# Patient Record
Sex: MALE | Race: WHITE | NOT HISPANIC OR LATINO | Employment: FULL TIME | URBAN - NONMETROPOLITAN AREA
[De-identification: names, ages, dates, MRNs, and addresses within clinical notes are randomized per-mention and may not be internally consistent; named-entity substitution may affect disease eponyms.]

---

## 2019-09-30 ENCOUNTER — OFFICE VISIT (OUTPATIENT)
Dept: FAMILY MEDICINE CLINIC | Facility: CLINIC | Age: 49
End: 2019-09-30
Payer: COMMERCIAL

## 2019-09-30 VITALS
DIASTOLIC BLOOD PRESSURE: 66 MMHG | SYSTOLIC BLOOD PRESSURE: 116 MMHG | HEIGHT: 69 IN | BODY MASS INDEX: 26.36 KG/M2 | WEIGHT: 178 LBS

## 2019-09-30 DIAGNOSIS — E03.9 ACQUIRED HYPOTHYROIDISM: Chronic | ICD-10-CM

## 2019-09-30 DIAGNOSIS — N52.9 VASCULOGENIC ERECTILE DYSFUNCTION, UNSPECIFIED VASCULOGENIC ERECTILE DYSFUNCTION TYPE: Chronic | ICD-10-CM

## 2019-09-30 DIAGNOSIS — Z00.00 WELLNESS EXAMINATION: ICD-10-CM

## 2019-09-30 DIAGNOSIS — F19.11 HISTORY OF SUBSTANCE ABUSE (HCC): Chronic | ICD-10-CM

## 2019-09-30 DIAGNOSIS — F33.41 RECURRENT MAJOR DEPRESSIVE DISORDER, IN PARTIAL REMISSION (HCC): Primary | ICD-10-CM

## 2019-09-30 PROCEDURE — 99396 PREV VISIT EST AGE 40-64: CPT | Performed by: FAMILY MEDICINE

## 2019-09-30 RX ORDER — ARIPIPRAZOLE 2 MG/1
2 TABLET ORAL DAILY
Qty: 30 TABLET | Refills: 5 | Status: SHIPPED | OUTPATIENT
Start: 2019-09-30 | End: 2020-07-27 | Stop reason: SDUPTHER

## 2019-09-30 RX ORDER — SILDENAFIL CITRATE 20 MG/1
20 TABLET ORAL DAILY PRN
COMMUNITY
End: 2019-12-27 | Stop reason: SDUPTHER

## 2019-09-30 NOTE — PROGRESS NOTES
ADULT ANNUAL [address] [facility] PRIMARY CARE    NAME: María Raza  AGE: 50 y o  SEX: male  : 1970     DATE: 2019     Assessment and Plan:     Problem List Items Addressed This Visit        Endocrine    Acquired hypothyroidism (Chronic)       Other    Recurrent major depressive disorder, in partial remission ([facility] ) - Primary (Chronic)    Relevant Medications    ARIPiprazole (ABILIFY) 2 mg tablet    Vasculogenic erectile dysfunction (Chronic)    History of substance abuse ([facility] ) (Chronic)      Other Visit Diagnoses     Wellness examination        Will check renal and lipid panel for wellness check    Relevant Orders    Renal function panel    Lipid panel          Immunizations and preventive care screenings were discussed with patient today  Appropriate education was printed on patient's after visit summary  Counseling:  · Alcohol/drug use: discussed moderation in alcohol intake, the recommendations for healthy alcohol use, and avoidance of illicit drug use  Return in about 4 months (around 2020) for Recheck  Chief Complaint:     Chief Complaint   Patient presents with    Annual Exam     discuss recurrent sinus infections-did see  Dr Carole Oconnor but wasn't helpful  would like to discuss blood work      History of Present Illness:     Adult Annual Physical   Patient here for a comprehensive physical exam  The patient reports problems - Having trouble with fatigue which he feel this relates to the Seroquel only taking 12-,1/2 mg at this time  Diet and Physical Activity  · Diet/Nutrition: well balanced diet  · Exercise: walking        Depression Screening  PHQ-9 Depression Screening    PHQ-9:    Frequency of the following problems over the past two weeks:       Little interest or pleasure in doing things:  0 - not at all  Feeling down, depressed, or hopeless:  0 - not at all  PHQ-2 Score:  0       General Health  · Sleep: sleeps well    · Hearing: normal - bilateral   · Vision: no vision problems  · Dental: regular dental visits  [facility]  · Symptoms include: erectile dysfunction     Review of Systems:     Review of Systems   Constitutional: Positive for fatigue (Which he feels is due to the Seroquel)  Negative for activity change, appetite change, chills, fever and unexpected weight change  HENT: Negative for congestion, dental problem, hearing loss, rhinorrhea, trouble swallowing and voice change  Eyes: Negative for visual disturbance  Respiratory: Negative for apnea, cough, chest tightness and shortness of breath  Cardiovascular: Negative for chest pain, palpitations and leg swelling  Gastrointestinal: Negative for abdominal distention, abdominal pain, constipation and diarrhea  Endocrine: Negative for polyuria ( nocturia x1)  Genitourinary: Negative for difficulty urinating and enuresis  Musculoskeletal: Negative for arthralgias and myalgias  Skin: Negative for rash  Allergic/Immunologic: Negative for environmental allergies  Neurological: Negative for dizziness, weakness, light-headedness, numbness and headaches  Hematological: Negative for adenopathy  Does not bruise/bleed easily  Psychiatric/Behavioral: Negative for agitation, confusion and sleep disturbance        Past Medical History:     Past Medical History:   Diagnosis Date    Depressive disorder     Hypothyroidism       Past Surgical History:     Past Surgical History:   Procedure Laterality Date    NOSE SURGERY      TONSILLECTOMY        Family History:     Family History   Problem Relation Age of Onset   [place] Breast cancer Mother     Kidney cancer Mother     Breast cancer Family     Kidney cancer Family       Social History:     Social History     Socioeconomic History    Marital status: /Civil Union     Spouse name: None    Number of children: None    Years of education: None    Highest education level: None   Occupational History    None   Social Needs    Financial resource strain: None    Food insecurity:     Worry: None     Inability: None    Transportation needs:     Medical: None     Non-medical: None   Tobacco Use    Smoking status: Former Smoker    Smokeless tobacco: Never Used   Substance and Sexual Activity    Alcohol use: Not Currently     Comment: Denies alcohol use - As per [employer] Drug use: Never    Sexual activity: None   Lifestyle    Physical activity:     Days per week: None     Minutes per session: None    Stress: None   Relationships    Social connections:     Talks on phone: None     Gets together: None     Attends Islam service: None     Active member of club or organization: None     Attends meetings of clubs or organizations: None     Relationship status: None    Intimate partner violence:     Fear of current or ex partner: None     Emotionally abused: None     Physically abused: None     Forced sexual activity: None   Other Topics Concern    None   Social History Narrative    Home is not smoke-free       Current Medications:     Current Outpatient Medications   Medication Sig Dispense Refill    buprenorphine-naloxone (SUBOXONE) 8-2 mg Place 1 tablet under the tongue daily      FLUoxetine (PROzac) 40 MG capsule Take 40 mg by mouth daily      levothyroxine 125 mcg tablet Take 100 mcg by mouth daily       QUEtiapine (SEROquel) 25 mg tablet Take 25 mg by mouth      sildenafil (REVATIO) 20 mg tablet Take 20 mg by mouth daily as needed      ARIPiprazole (ABILIFY) 2 mg tablet Take 1 tablet (2 mg total) by mouth daily 30 tablet 5    terbinafine (LamISIL) 1 % cream Apply 1 application topically 2 (two) times a day       No current facility-administered medications for this visit         Allergies:     No Known Allergies   Physical Exam:     /66 (BP Location: Right arm, Patient Position: Sitting, Cuff Size: Standard)   Ht 5' 9" (1 753 m)   Wt 80 7 kg (178 lb)   BMI 26 29 kg/m² Physical Exam   Constitutional: He is oriented to person, place, and time  He appears well-developed  HENT:   Head: Normocephalic  Right Ear: Hearing, tympanic membrane, external ear and ear canal normal    Left Ear: Hearing, tympanic membrane, external ear and ear canal normal    Nose: Nose normal    Mouth/Throat: Oropharynx is clear and moist    Eyes: Pupils are equal, round, and reactive to light  Conjunctivae and EOM are normal    Neck: Normal range of motion  Neck supple  No thyromegaly present  Cardiovascular: Normal rate, regular rhythm, normal heart sounds and intact distal pulses  No murmur (Rate is 78 no bruits are noted) heard  Pulses:       Dorsalis pedis pulses are 1+ on the right side, and 1+ on the left side  Posterior tibial pulses are 2+ on the right side, and 2+ on the left side  Pulmonary/Chest: Effort normal and breath sounds normal    Abdominal: Soft  He exhibits no distension and no mass  There is no tenderness  Genitourinary: Penis normal    Musculoskeletal: Normal range of motion  He exhibits no edema  Feet:   Right Foot:   Protective Sensation: 8 sites tested  8 sites sensed  Left Foot:   Protective Sensation: 8 sites tested  8 sites sensed  Lymphadenopathy:     He has no cervical adenopathy  Neurological: He is alert and oriented to person, place, and time  He displays normal reflexes  No cranial nerve deficit or sensory deficit  He exhibits normal muscle tone  Coordination normal    Skin: Skin is warm and dry  Capillary refill takes less than 2 seconds  No rash noted  Psychiatric: He has a normal mood and affect  His behavior is normal  Judgment and thought content normal    Nursing note and vitals reviewed        Elba Anand MD  [address]

## 2019-09-30 NOTE — PATIENT INSTRUCTIONS
Overall exam today is stable  Discussed the problem with the fatigue and will try switching from Seroquel over to Abilify 2 mg daily    Patient is going to get flu shot at work and will check renal and lipid panel for wellness check

## 2019-12-27 DIAGNOSIS — N52.9 VASCULOGENIC ERECTILE DYSFUNCTION, UNSPECIFIED VASCULOGENIC ERECTILE DYSFUNCTION TYPE: Primary | Chronic | ICD-10-CM

## 2019-12-27 RX ORDER — SILDENAFIL CITRATE 20 MG/1
TABLET ORAL
Qty: 5 TABLET | Refills: 0 | Status: SHIPPED | OUTPATIENT
Start: 2019-12-27 | End: 2020-01-23

## 2020-01-23 DIAGNOSIS — N52.9 VASCULOGENIC ERECTILE DYSFUNCTION, UNSPECIFIED VASCULOGENIC ERECTILE DYSFUNCTION TYPE: Chronic | ICD-10-CM

## 2020-01-23 RX ORDER — SILDENAFIL CITRATE 20 MG/1
TABLET ORAL
Qty: 5 TABLET | Refills: 0 | Status: SHIPPED | OUTPATIENT
Start: 2020-01-23 | End: 2020-01-27 | Stop reason: SDUPTHER

## 2020-01-27 ENCOUNTER — OFFICE VISIT (OUTPATIENT)
Dept: FAMILY MEDICINE CLINIC | Facility: CLINIC | Age: 50
End: 2020-01-27
Payer: COMMERCIAL

## 2020-01-27 VITALS
BODY MASS INDEX: 29.18 KG/M2 | HEIGHT: 69 IN | DIASTOLIC BLOOD PRESSURE: 72 MMHG | SYSTOLIC BLOOD PRESSURE: 122 MMHG | WEIGHT: 197 LBS

## 2020-01-27 DIAGNOSIS — F19.11 HISTORY OF SUBSTANCE ABUSE (HCC): Chronic | ICD-10-CM

## 2020-01-27 DIAGNOSIS — E03.9 ACQUIRED HYPOTHYROIDISM: Primary | Chronic | ICD-10-CM

## 2020-01-27 DIAGNOSIS — N52.9 VASCULOGENIC ERECTILE DYSFUNCTION, UNSPECIFIED VASCULOGENIC ERECTILE DYSFUNCTION TYPE: Chronic | ICD-10-CM

## 2020-01-27 DIAGNOSIS — F33.41 RECURRENT MAJOR DEPRESSIVE DISORDER, IN PARTIAL REMISSION (HCC): Chronic | ICD-10-CM

## 2020-01-27 PROCEDURE — 99213 OFFICE O/P EST LOW 20 MIN: CPT | Performed by: FAMILY MEDICINE

## 2020-01-27 PROCEDURE — 1036F TOBACCO NON-USER: CPT | Performed by: FAMILY MEDICINE

## 2020-01-27 PROCEDURE — 3008F BODY MASS INDEX DOCD: CPT | Performed by: FAMILY MEDICINE

## 2020-01-27 RX ORDER — SILDENAFIL CITRATE 20 MG/1
20 TABLET ORAL DAILY PRN
Qty: 15 TABLET | Refills: 5 | Status: SHIPPED | OUTPATIENT
Start: 2020-01-27 | End: 2020-06-15

## 2020-01-27 NOTE — PATIENT INSTRUCTIONS
Seems to be doing well but is concerned about the weight gain which I do not feel related to the Abilify    Discussed dietary changes and should try to limit starches to 1 medium serving per meal   Will recheck thyroid levels

## 2020-01-27 NOTE — PROGRESS NOTES
Assessment/Plan:    Acquired hypothyroidism  Will recheck thyroid levels which could relate to the weight gain and will adjust meds accordingly    History of substance abuse ([facility] )  Is now using injection continue overall follow-up    Recurrent major depressive disorder, in partial remission ([facility] )  Seemingly doing well with just use of the Prozac will continue with this time    Vasculogenic erectile dysfunction  Appears stable continue p r n  Meds  I did give refill    BMI 29 0-29 9,adult  Discussed need to limit starches to 1 medium serving per day and get mild exercise on a daily basis       Diagnoses and all orders for this visit:    Acquired hypothyroidism  -     TSH, 3rd generation with Free T4 reflex; Future    Vasculogenic erectile dysfunction, unspecified vasculogenic erectile dysfunction type  -     sildenafil (REVATIO) 20 mg tablet; Take 1 tablet (20 mg total) by mouth daily as needed (Q day as needed)    History of substance abuse (HCC)    Recurrent major depressive disorder, in partial remission (HCC)    BMI 29 0-29 9,adult          Subjective:      Patient ID: Vanessa Negrete is a 52 y o  male  Patient has history of hypothyroidism, substance abuse, erectile dysfunction and depression  Had been concerned about weight gain and stop the Abilify about 3-4 weeks ago although the weight has not improved  Does feel the depression is doing okay with just use of the Prozac      The following portions of the patient's history were reviewed and updated as appropriate: allergies, current medications, past medical history, past social history and problem list BMI Counseling: Body mass index is 29 09 kg/m²  The BMI is above normal  Nutrition recommendations include moderation in carbohydrate intake       Review of Systems   Constitutional: Negative for activity change, appetite change, chills, fatigue, fever and unexpected weight change     HENT: Negative for congestion, rhinorrhea, trouble swallowing and voice change  Eyes: Negative for visual disturbance  Respiratory: Negative for apnea, cough, chest tightness and shortness of breath  Cardiovascular: Negative for chest pain, palpitations and leg swelling  Gastrointestinal: Negative for abdominal distention, abdominal pain, constipation and diarrhea  Endocrine: Negative for polyuria (Nocturia times 1)  Genitourinary: Negative for difficulty urinating  Musculoskeletal: Negative for arthralgias and myalgias  Skin: Negative for rash  Allergic/Immunologic: Negative for environmental allergies  Neurological: Negative for dizziness, weakness, light-headedness, numbness and headaches  Hematological: Negative for adenopathy  Does not bruise/bleed easily  Psychiatric/Behavioral: Negative for agitation, confusion, dysphoric mood and sleep disturbance  Objective:      /72 (BP Location: Right arm, Patient Position: Sitting, Cuff Size: Standard)   Ht 5' 9" (1 753 m)   Wt 89 4 kg (197 lb)   BMI 29 09 kg/m²          Physical Exam   Constitutional: He is oriented to person, place, and time  He appears well-developed and well-nourished  No distress  HENT:   Head: Normocephalic  Nose: Nose normal    Mouth/Throat: Oropharynx is clear and moist    Eyes: Conjunctivae are normal    Neck: Neck supple  No thyromegaly present  Cardiovascular: Normal rate, regular rhythm and normal heart sounds  No murmur (Rate is 72 no bruits are noted) heard  Pulmonary/Chest: Effort normal and breath sounds normal    Abdominal: He exhibits no distension and no mass  There is no tenderness  Musculoskeletal: He exhibits no edema  Lymphadenopathy:     He has no cervical adenopathy  Neurological: He is alert and oriented to person, place, and time  He displays normal reflexes  Coordination normal    Skin: Skin is warm and dry  No rash noted  Psychiatric: He has a normal mood and affect   His behavior is normal  Judgment and thought content normal  Nursing note and vitals reviewed

## 2020-01-29 DIAGNOSIS — E03.9 ACQUIRED HYPOTHYROIDISM: Primary | Chronic | ICD-10-CM

## 2020-01-29 RX ORDER — LEVOTHYROXINE SODIUM 0.15 MG/1
150 TABLET ORAL
Qty: 30 TABLET | Refills: 5 | Status: SHIPPED | OUTPATIENT
Start: 2020-01-29 | End: 2020-06-24

## 2020-01-30 DIAGNOSIS — E03.9 ACQUIRED HYPOTHYROIDISM: Primary | Chronic | ICD-10-CM

## 2020-05-11 ENCOUNTER — TELEPHONE (OUTPATIENT)
Dept: FAMILY MEDICINE CLINIC | Facility: CLINIC | Age: 50
End: 2020-05-11

## 2020-05-11 DIAGNOSIS — Z20.822 CLOSE EXPOSURE TO COVID-19 VIRUS: Primary | ICD-10-CM

## 2020-06-03 PROBLEM — Z86.16 HISTORY OF 2019 NOVEL CORONAVIRUS DISEASE (COVID-19): Status: ACTIVE | Noted: 2020-06-03

## 2020-06-03 LAB — SARS-COV-2 IGG SERPL QL IA: POSITIVE

## 2020-06-08 ENCOUNTER — OFFICE VISIT (OUTPATIENT)
Dept: FAMILY MEDICINE CLINIC | Facility: CLINIC | Age: 50
End: 2020-06-08
Payer: COMMERCIAL

## 2020-06-08 VITALS
HEIGHT: 69 IN | TEMPERATURE: 97.9 F | DIASTOLIC BLOOD PRESSURE: 78 MMHG | SYSTOLIC BLOOD PRESSURE: 132 MMHG | WEIGHT: 212 LBS | BODY MASS INDEX: 31.4 KG/M2

## 2020-06-08 DIAGNOSIS — R63.5 ABNORMAL WEIGHT GAIN: ICD-10-CM

## 2020-06-08 DIAGNOSIS — F33.41 RECURRENT MAJOR DEPRESSIVE DISORDER, IN PARTIAL REMISSION (HCC): Chronic | ICD-10-CM

## 2020-06-08 DIAGNOSIS — F19.11 HISTORY OF SUBSTANCE ABUSE (HCC): Chronic | ICD-10-CM

## 2020-06-08 DIAGNOSIS — E03.9 ACQUIRED HYPOTHYROIDISM: Primary | Chronic | ICD-10-CM

## 2020-06-08 DIAGNOSIS — R63.5 WEIGHT GAIN, ABNORMAL: ICD-10-CM

## 2020-06-08 PROCEDURE — 1036F TOBACCO NON-USER: CPT | Performed by: FAMILY MEDICINE

## 2020-06-08 PROCEDURE — 99213 OFFICE O/P EST LOW 20 MIN: CPT | Performed by: FAMILY MEDICINE

## 2020-06-08 PROCEDURE — 3008F BODY MASS INDEX DOCD: CPT | Performed by: FAMILY MEDICINE

## 2020-06-15 DIAGNOSIS — N52.9 VASCULOGENIC ERECTILE DYSFUNCTION, UNSPECIFIED VASCULOGENIC ERECTILE DYSFUNCTION TYPE: Chronic | ICD-10-CM

## 2020-06-15 RX ORDER — SILDENAFIL CITRATE 20 MG/1
TABLET ORAL
Qty: 5 TABLET | Refills: 5 | Status: SHIPPED | OUTPATIENT
Start: 2020-06-15 | End: 2020-09-22

## 2020-06-23 LAB
ALBUMIN SERPL-MCNC: 4 G/DL (ref 3.6–5.1)
BUN SERPL-MCNC: 21 MG/DL (ref 7–25)
BUN/CREAT SERPL: ABNORMAL (CALC) (ref 6–22)
CALCIUM SERPL-MCNC: 9.1 MG/DL (ref 8.6–10.3)
CHLORIDE SERPL-SCNC: 102 MMOL/L (ref 98–110)
CO2 SERPL-SCNC: 29 MMOL/L (ref 20–32)
CREAT SERPL-MCNC: 0.7 MG/DL (ref 0.6–1.35)
GLUCOSE SERPL-MCNC: 101 MG/DL (ref 65–99)
PHOSPHATE SERPL-MCNC: 4.5 MG/DL (ref 2.5–4.5)
POTASSIUM SERPL-SCNC: 4.2 MMOL/L (ref 3.5–5.3)
SL AMB EGFR AFRICAN AMERICAN: 128 ML/MIN/1.73M2
SL AMB EGFR NON AFRICAN AMERICAN: 111 ML/MIN/1.73M2
SODIUM SERPL-SCNC: 139 MMOL/L (ref 135–146)
T4 FREE SERPL-MCNC: 1 NG/DL (ref 0.8–1.8)
TSH SERPL-ACNC: 14.02 MIU/L (ref 0.4–4.5)

## 2020-06-24 ENCOUNTER — TELEPHONE (OUTPATIENT)
Dept: FAMILY MEDICINE CLINIC | Facility: CLINIC | Age: 50
End: 2020-06-24

## 2020-06-24 DIAGNOSIS — E03.9 ACQUIRED HYPOTHYROIDISM: Chronic | ICD-10-CM

## 2020-06-24 RX ORDER — LEVOTHYROXINE SODIUM 175 UG/1
175 TABLET ORAL
Qty: 90 TABLET | Refills: 3 | Status: SHIPPED | OUTPATIENT
Start: 2020-06-24 | End: 2021-07-22

## 2020-06-29 ENCOUNTER — OFFICE VISIT (OUTPATIENT)
Dept: FAMILY MEDICINE CLINIC | Facility: CLINIC | Age: 50
End: 2020-06-29
Payer: COMMERCIAL

## 2020-06-29 VITALS
BODY MASS INDEX: 31.1 KG/M2 | HEIGHT: 69 IN | WEIGHT: 210 LBS | SYSTOLIC BLOOD PRESSURE: 122 MMHG | DIASTOLIC BLOOD PRESSURE: 72 MMHG

## 2020-06-29 DIAGNOSIS — G57.12 NEUROPATHY OF LEFT LATERAL FEMORAL CUTANEOUS NERVE: Primary | ICD-10-CM

## 2020-06-29 PROCEDURE — 1036F TOBACCO NON-USER: CPT | Performed by: FAMILY MEDICINE

## 2020-06-29 PROCEDURE — 3008F BODY MASS INDEX DOCD: CPT | Performed by: FAMILY MEDICINE

## 2020-06-29 PROCEDURE — 99213 OFFICE O/P EST LOW 20 MIN: CPT | Performed by: FAMILY MEDICINE

## 2020-06-29 RX ORDER — AMITRIPTYLINE HYDROCHLORIDE 10 MG/1
10 TABLET, FILM COATED ORAL
Qty: 60 TABLET | Refills: 5 | Status: SHIPPED | OUTPATIENT
Start: 2020-06-29 | End: 2020-07-13 | Stop reason: ALTCHOICE

## 2020-07-13 ENCOUNTER — OFFICE VISIT (OUTPATIENT)
Dept: FAMILY MEDICINE CLINIC | Facility: CLINIC | Age: 50
End: 2020-07-13
Payer: COMMERCIAL

## 2020-07-13 VITALS
BODY MASS INDEX: 31.1 KG/M2 | DIASTOLIC BLOOD PRESSURE: 76 MMHG | SYSTOLIC BLOOD PRESSURE: 126 MMHG | TEMPERATURE: 97.8 F | WEIGHT: 210 LBS | HEIGHT: 69 IN

## 2020-07-13 DIAGNOSIS — G57.12 NEUROPATHY OF LEFT LATERAL FEMORAL CUTANEOUS NERVE: Primary | Chronic | ICD-10-CM

## 2020-07-13 PROCEDURE — 99213 OFFICE O/P EST LOW 20 MIN: CPT | Performed by: FAMILY MEDICINE

## 2020-07-13 RX ORDER — AMITRIPTYLINE HYDROCHLORIDE 25 MG/1
25 TABLET, FILM COATED ORAL
Qty: 30 TABLET | Refills: 5 | Status: SHIPPED | OUTPATIENT
Start: 2020-07-13 | End: 2021-01-06

## 2020-07-13 NOTE — PATIENT INSTRUCTIONS
Overall seems to be doing well with the amitriptyline    Will switch the prescription from the 10 mg tablet taking 2 over  to 25 mg taking 1 at night

## 2020-07-13 NOTE — PROGRESS NOTES
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  -     amitriptyline (ELAVIL) 25 mg tablet; Take 1 tablet (25 mg total) by mouth daily at bedtime          Subjective:      Patient ID: Thom Griffin is a 52 y o  male  Patient has history of hypothyroidism, substance abuse, erectile dysfunction and depression  Has been having longstanding problem with numbness with intermittent pain in the left thigh that has gotten somewhat worse   Was placed on amitriptyline 10 mg and tapered up to 20 mg at night and is doing much better  Still has the numbness although the pain is basically resolved      The following portions of the patient's history were reviewed and updated as appropriate: allergies, current medications, past medical history, past social history and problem list       Review of Systems   Constitutional: Negative for fatigue  HENT: Negative for congestion  Respiratory: Negative for cough  Cardiovascular: Negative for leg swelling  Gastrointestinal: Negative for abdominal pain  Musculoskeletal: Negative for arthralgias and myalgias  Neurological: Positive for numbness  Negative for weakness  Psychiatric/Behavioral: Negative for sleep disturbance  Objective:      /76 (BP Location: Right arm, Patient Position: Sitting, Cuff Size: Standard)   Temp 97 8 °F (36 6 °C)   Ht 5' 9" (1 753 m)   Wt 95 3 kg (210 lb)   BMI 31 01 kg/m²          Physical Exam   Constitutional: He is oriented to person, place, and time  He appears well-developed and well-nourished  No distress  HENT:   Head: Normocephalic     Eyes: Conjunctivae are normal    Neck: Normal range of motion  Neck supple  No thyromegaly present  Cardiovascular: Normal rate, regular rhythm and normal heart sounds  No murmur (Rate is 72  ) heard  Pulmonary/Chest: Effort normal and breath sounds normal    Abdominal: He exhibits no distension and no mass  There is no tenderness  Musculoskeletal: Normal range of motion  He exhibits no edema  Lymphadenopathy:     He has no cervical adenopathy  Neurological: He is alert and oriented to person, place, and time  He displays normal reflexes  Coordination normal    Skin: Skin is warm and dry  No rash noted  Psychiatric: He has a normal mood and affect  Nursing note and vitals reviewed

## 2020-07-13 NOTE — ASSESSMENT & PLAN NOTE
Doing much better will switch from the 20 mg dose of 2 tablets over to 25 mg dose with 1 tablet    Should take this at bedtime patient had question is needs to continue or other antidepressants and I suggest yes

## 2020-07-27 DIAGNOSIS — F33.41 RECURRENT MAJOR DEPRESSIVE DISORDER, IN PARTIAL REMISSION (HCC): ICD-10-CM

## 2020-07-27 RX ORDER — ARIPIPRAZOLE 2 MG/1
2 TABLET ORAL DAILY
Qty: 30 TABLET | Refills: 5 | Status: SHIPPED | OUTPATIENT
Start: 2020-07-27 | End: 2021-04-12

## 2020-09-22 DIAGNOSIS — N52.9 VASCULOGENIC ERECTILE DYSFUNCTION, UNSPECIFIED VASCULOGENIC ERECTILE DYSFUNCTION TYPE: Chronic | ICD-10-CM

## 2020-09-22 RX ORDER — SILDENAFIL CITRATE 20 MG/1
TABLET ORAL
Qty: 15 TABLET | Refills: 2 | Status: SHIPPED | OUTPATIENT
Start: 2020-09-22 | End: 2021-01-04

## 2020-12-14 ENCOUNTER — OFFICE VISIT (OUTPATIENT)
Dept: FAMILY MEDICINE CLINIC | Facility: CLINIC | Age: 50
End: 2020-12-14
Payer: COMMERCIAL

## 2020-12-14 VITALS
TEMPERATURE: 98.4 F | BODY MASS INDEX: 31.96 KG/M2 | HEIGHT: 69 IN | DIASTOLIC BLOOD PRESSURE: 78 MMHG | WEIGHT: 215.8 LBS | SYSTOLIC BLOOD PRESSURE: 130 MMHG | HEART RATE: 92 BPM | OXYGEN SATURATION: 86 %

## 2020-12-14 DIAGNOSIS — Z12.11 SPECIAL SCREENING FOR MALIGNANT NEOPLASMS, COLON: ICD-10-CM

## 2020-12-14 DIAGNOSIS — Z23 NEEDS FLU SHOT: ICD-10-CM

## 2020-12-14 DIAGNOSIS — Z00.00 WELLNESS EXAMINATION: Primary | ICD-10-CM

## 2020-12-14 DIAGNOSIS — E03.9 ACQUIRED HYPOTHYROIDISM: Chronic | ICD-10-CM

## 2020-12-14 DIAGNOSIS — G57.12 NEUROPATHY OF LEFT LATERAL FEMORAL CUTANEOUS NERVE: Chronic | ICD-10-CM

## 2020-12-14 DIAGNOSIS — F33.41 RECURRENT MAJOR DEPRESSIVE DISORDER, IN PARTIAL REMISSION (HCC): Chronic | ICD-10-CM

## 2020-12-14 DIAGNOSIS — Z12.12 SCREENING FOR MALIGNANT NEOPLASM OF THE RECTUM: ICD-10-CM

## 2020-12-14 PROCEDURE — 1036F TOBACCO NON-USER: CPT | Performed by: FAMILY MEDICINE

## 2020-12-14 PROCEDURE — 3008F BODY MASS INDEX DOCD: CPT | Performed by: FAMILY MEDICINE

## 2020-12-14 PROCEDURE — 90471 IMMUNIZATION ADMIN: CPT | Performed by: FAMILY MEDICINE

## 2020-12-14 PROCEDURE — 3725F SCREEN DEPRESSION PERFORMED: CPT | Performed by: FAMILY MEDICINE

## 2020-12-14 PROCEDURE — 99396 PREV VISIT EST AGE 40-64: CPT | Performed by: FAMILY MEDICINE

## 2020-12-14 PROCEDURE — 90682 RIV4 VACC RECOMBINANT DNA IM: CPT | Performed by: FAMILY MEDICINE

## 2020-12-14 RX ORDER — FLUOXETINE HYDROCHLORIDE 40 MG/1
40 CAPSULE ORAL DAILY
Qty: 30 CAPSULE | Refills: 5 | Status: SHIPPED | OUTPATIENT
Start: 2020-12-14 | End: 2022-03-20

## 2021-01-04 DIAGNOSIS — N52.9 VASCULOGENIC ERECTILE DYSFUNCTION, UNSPECIFIED VASCULOGENIC ERECTILE DYSFUNCTION TYPE: Chronic | ICD-10-CM

## 2021-01-04 RX ORDER — SILDENAFIL CITRATE 20 MG/1
TABLET ORAL
Qty: 15 TABLET | Refills: 5 | Status: SHIPPED | OUTPATIENT
Start: 2021-01-04 | End: 2021-08-14 | Stop reason: HOSPADM

## 2021-01-05 DIAGNOSIS — G57.12 NEUROPATHY OF LEFT LATERAL FEMORAL CUTANEOUS NERVE: Chronic | ICD-10-CM

## 2021-01-06 RX ORDER — AMITRIPTYLINE HYDROCHLORIDE 25 MG/1
TABLET, FILM COATED ORAL
Qty: 30 TABLET | Refills: 5 | Status: SHIPPED | OUTPATIENT
Start: 2021-01-06

## 2021-01-12 ENCOUNTER — TELEPHONE (OUTPATIENT)
Dept: FAMILY MEDICINE CLINIC | Facility: CLINIC | Age: 51
End: 2021-01-12

## 2021-01-12 LAB
ALBUMIN SERPL-MCNC: 4.4 G/DL (ref 3.6–5.1)
BUN SERPL-MCNC: 19 MG/DL (ref 7–25)
BUN/CREAT SERPL: ABNORMAL (CALC) (ref 6–22)
CALCIUM SERPL-MCNC: 9.5 MG/DL (ref 8.6–10.3)
CHLORIDE SERPL-SCNC: 100 MMOL/L (ref 98–110)
CHOLEST SERPL-MCNC: 228 MG/DL
CHOLEST/HDLC SERPL: 3.1 (CALC)
CO2 SERPL-SCNC: 27 MMOL/L (ref 20–32)
CREAT SERPL-MCNC: 0.84 MG/DL (ref 0.7–1.33)
GLUCOSE SERPL-MCNC: 101 MG/DL (ref 65–99)
HDLC SERPL-MCNC: 73 MG/DL
LDLC SERPL CALC-MCNC: 126 MG/DL (CALC)
NONHDLC SERPL-MCNC: 155 MG/DL (CALC)
PHOSPHATE SERPL-MCNC: 3.9 MG/DL (ref 2.5–4.5)
POTASSIUM SERPL-SCNC: 3.8 MMOL/L (ref 3.5–5.3)
SL AMB EGFR AFRICAN AMERICAN: 118 ML/MIN/1.73M2
SL AMB EGFR NON AFRICAN AMERICAN: 102 ML/MIN/1.73M2
SODIUM SERPL-SCNC: 138 MMOL/L (ref 135–146)
T4 FREE SERPL-MCNC: 1.3 NG/DL (ref 0.8–1.8)
TRIGL SERPL-MCNC: 173 MG/DL
TSH SERPL-ACNC: 1.38 MIU/L (ref 0.4–4.5)

## 2021-01-12 NOTE — RESULT ENCOUNTER NOTE
Please call the patient regarding his abnormal result  Fasting blood sugar is again borderline high at 101  Watch sweets in diet cholesterol levels are okay  Thyroid levels are okay    Continue current meds

## 2021-01-12 NOTE — TELEPHONE ENCOUNTER
----- Message from Alejandro Suh MD sent at 1/12/2021  8:46 AM EST -----  Please call the patient regarding his abnormal result  Fasting blood sugar is again borderline high at 101  Watch sweets in diet cholesterol levels are okay  Thyroid levels are okay    Continue current meds      Left message for Laurie Hermosillo to call back

## 2021-02-10 ENCOUNTER — TELEPHONE (OUTPATIENT)
Dept: FAMILY MEDICINE CLINIC | Facility: CLINIC | Age: 51
End: 2021-02-10

## 2021-02-10 NOTE — TELEPHONE ENCOUNTER
Mick Villanueva called, he had FLMA filled out by you in July and it  with his employer so he needs it redone  Does he need to make an appointment with you or can you complete it if he drops it off based on the [facility] paperwork in July and his appt with you 2 months ago?

## 2021-02-11 ENCOUNTER — OFFICE VISIT (OUTPATIENT)
Dept: FAMILY MEDICINE CLINIC | Facility: CLINIC | Age: 51
End: 2021-02-11

## 2021-02-11 VITALS
SYSTOLIC BLOOD PRESSURE: 124 MMHG | DIASTOLIC BLOOD PRESSURE: 74 MMHG | WEIGHT: 212 LBS | HEART RATE: 84 BPM | HEIGHT: 69 IN | BODY MASS INDEX: 31.4 KG/M2 | OXYGEN SATURATION: 97 %

## 2021-02-11 DIAGNOSIS — G57.12 NEUROPATHY OF LEFT LATERAL FEMORAL CUTANEOUS NERVE: Primary | Chronic | ICD-10-CM

## 2021-02-11 PROCEDURE — 1036F TOBACCO NON-USER: CPT | Performed by: FAMILY MEDICINE

## 2021-02-11 PROCEDURE — 99213 OFFICE O/P EST LOW 20 MIN: CPT | Performed by: FAMILY MEDICINE

## 2021-02-11 PROCEDURE — 3008F BODY MASS INDEX DOCD: CPT | Performed by: FAMILY MEDICINE

## 2021-02-11 RX ORDER — BUPRENORPHINE 100 MG/1
SOLUTION SUBCUTANEOUS
COMMUNITY
Start: 2020-12-31 | End: 2021-08-14 | Stop reason: HOSPADM

## 2021-02-11 NOTE — PROGRESS NOTES
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      BMI Counseling: Body mass index is 31 31 kg/m²  The BMI is above normal  Nutrition recommendations include moderation in carbohydrate intake  Exercise recommendations include moderate physical activity 150 minutes/week  No pharmacotherapy was ordered  Subjective:      Patient ID: Eric Fong is a 48 y o  male  Patient has history of hypothyroidism, substance abuse, erectile dysfunction and depression  Has been having recurring problems with pain in the left femoral cutaneous nerve  This is usually settled with use of the amitriptyline but does continue to intermittently flare and has been taking a day off work when this occurs perhaps every other month  Is going to need FMLA form so that he does not get points for this  It is not painful today      The following portions of the patient's history were reviewed and updated as appropriate: allergies, current medications, past medical history, past social history and problem list     Review of Systems   Constitutional: Negative for activity change  HENT: Negative for congestion  Eyes: Negative for visual disturbance  Cardiovascular: Negative for leg swelling  Gastrointestinal: Negative for abdominal pain  Genitourinary: Positive for testicular pain  Negative for difficulty urinating  Musculoskeletal: Positive for myalgias ( intermittent pain in the left thigh)  Negative for arthralgias  Skin: Negative for rash  Neurological: Positive for numbness ( slight in the left thigh)  Negative for weakness  Hematological: Negative for adenopathy  Psychiatric/Behavioral: Negative for agitation and sleep disturbance  Objective:      /74 (BP Location: Right arm, Patient Position: Sitting, Cuff Size: Standard)   Pulse 84   Ht 5' 9" (1 753 m)   Wt 96 2 kg (212 lb)   SpO2 97%   BMI 31 31 kg/m²          Physical Exam  Vitals signs and nursing note reviewed  Constitutional:       Appearance: He is well-developed  HENT:      Head: Normocephalic  Eyes:      Conjunctiva/sclera: Conjunctivae normal    Neck:      Musculoskeletal: Normal range of motion and neck supple  Thyroid: No thyromegaly  Cardiovascular:      Rate and Rhythm: Normal rate and regular rhythm  Heart sounds: Normal heart sounds  No murmur ( rate is Seventy-eight)  Pulmonary:      Effort: Pulmonary effort is normal       Breath sounds: Normal breath sounds  Abdominal:      General: Abdomen is flat  There is no distension  Palpations: Abdomen is soft  There is no mass  Tenderness: There is no abdominal tenderness  Musculoskeletal: Normal range of motion  General: No tenderness ( to palpation of the left thigh area)  Right lower leg: No edema  Left lower leg: No edema  Lymphadenopathy:      Cervical: No cervical adenopathy  Skin:     General: Skin is warm and dry  Findings: No rash  Neurological:      Mental Status: He is alert and oriented to person, place, and time        Coordination: Coordination normal       Gait: Gait normal       Deep Tendon Reflexes: Reflexes normal    Psychiatric:         Mood and Affect: Mood normal

## 2021-02-11 NOTE — PATIENT INSTRUCTIONS
Discussed problem  Does continue to have intermittent problems with the femoral cutaneous nerve which requires in the take a day off of work    Overall I feel is doing well with the amitriptyline with this but will fill out the [facility] paper [employer] needs the additional time off which seems to occur perhaps once a month or less

## 2021-02-11 NOTE — ASSESSMENT & PLAN NOTE
Continues to have intermittent problems with pain which I feel may represent problem switch pressure against the anterior thigh when lifting boxes    But overall has been doing well with use of the amitriptyline will fill out the intermittent FMLA form and continue the amitriptyline

## 2021-02-19 ENCOUNTER — TELEPHONE (OUTPATIENT)
Dept: FAMILY MEDICINE CLINIC | Facility: CLINIC | Age: 51
End: 2021-02-19

## 2021-02-19 NOTE — TELEPHONE ENCOUNTER
Phone call to patient  Left message on machine to call back with credit card info so I call put the charge in before he picks up forms

## 2021-03-18 ENCOUNTER — TELEPHONE (OUTPATIENT)
Dept: FAMILY MEDICINE CLINIC | Facility: CLINIC | Age: 51
End: 2021-03-18

## 2021-03-18 NOTE — TELEPHONE ENCOUNTER
Phone call from patient 3/17/21  Had left message on machine at 4:48pm(I was already gone for the day) Stated something about a return phone call and a bill? I looked in his account  He had paid his $30FMLA form fee  I had advised him at last appt RTE was showing $30 copay  He stated he has never had a copay before  I think that is why he called  I called patient back  But had to leave a message on his machine

## 2021-04-12 DIAGNOSIS — F33.41 RECURRENT MAJOR DEPRESSIVE DISORDER, IN PARTIAL REMISSION (HCC): ICD-10-CM

## 2021-04-12 RX ORDER — ARIPIPRAZOLE 2 MG/1
TABLET ORAL
Qty: 30 TABLET | Refills: 0 | Status: SHIPPED | OUTPATIENT
Start: 2021-04-12 | End: 2021-07-26

## 2021-07-22 DIAGNOSIS — E03.9 ACQUIRED HYPOTHYROIDISM: Chronic | ICD-10-CM

## 2021-07-22 RX ORDER — LEVOTHYROXINE SODIUM 175 UG/1
TABLET ORAL
Qty: 30 TABLET | Refills: 0 | Status: SHIPPED | OUTPATIENT
Start: 2021-07-22 | End: 2022-01-03 | Stop reason: SDUPTHER

## 2021-08-13 ENCOUNTER — HOSPITAL ENCOUNTER (INPATIENT)
Facility: HOSPITAL | Age: 51
LOS: 1 days | Discharge: HOME | DRG: 897 | End: 2021-08-14
Attending: EMERGENCY MEDICINE | Admitting: FAMILY MEDICINE
Payer: COMMERCIAL

## 2021-08-13 DIAGNOSIS — F10.239 ALCOHOL WITHDRAWAL (HCC): ICD-10-CM

## 2021-08-13 DIAGNOSIS — F10.19 ALCOHOL ABUSE WITH UNSPECIFIED ALCOHOL-INDUCED DISORDER (HCC): ICD-10-CM

## 2021-08-13 DIAGNOSIS — F10.230 ALCOHOL WITHDRAWAL SYNDROME WITHOUT COMPLICATION (HCC): Primary | ICD-10-CM

## 2021-08-13 LAB
ALBUMIN SERPL BCP-MCNC: 3.9 G/DL (ref 3.5–5)
ALP SERPL-CCNC: 80 U/L (ref 46–116)
ALT SERPL W P-5'-P-CCNC: 39 U/L (ref 12–78)
AMPHETAMINES SERPL QL SCN: NEGATIVE
ANION GAP SERPL CALCULATED.3IONS-SCNC: 11 MMOL/L (ref 4–13)
AST SERPL W P-5'-P-CCNC: 24 U/L (ref 5–45)
BARBITURATES UR QL: NEGATIVE
BASOPHILS # BLD AUTO: 0.07 THOUSANDS/ΜL (ref 0–0.1)
BASOPHILS NFR BLD AUTO: 1 % (ref 0–1)
BENZODIAZ UR QL: NEGATIVE
BILIRUB SERPL-MCNC: 0.38 MG/DL (ref 0.2–1)
BILIRUB UR QL STRIP: NEGATIVE
BUN SERPL-MCNC: 16 MG/DL (ref 5–25)
CALCIUM SERPL-MCNC: 8 MG/DL (ref 8.3–10.1)
CHLORIDE SERPL-SCNC: 104 MMOL/L (ref 100–108)
CLARITY UR: CLEAR
CO2 SERPL-SCNC: 25 MMOL/L (ref 21–32)
COCAINE UR QL: NEGATIVE
COLOR UR: YELLOW
CREAT SERPL-MCNC: 0.78 MG/DL (ref 0.6–1.3)
EOSINOPHIL # BLD AUTO: 0.59 THOUSAND/ΜL (ref 0–0.61)
EOSINOPHIL NFR BLD AUTO: 5 % (ref 0–6)
ERYTHROCYTE [DISTWIDTH] IN BLOOD BY AUTOMATED COUNT: 12.6 % (ref 11.6–15.1)
ETHANOL SERPL-MCNC: <3 MG/DL (ref 0–3)
GFR SERPL CREATININE-BSD FRML MDRD: 105 ML/MIN/1.73SQ M
GLUCOSE SERPL-MCNC: 121 MG/DL (ref 65–140)
GLUCOSE UR STRIP-MCNC: NEGATIVE MG/DL
HCT VFR BLD AUTO: 41.5 % (ref 36.5–49.3)
HGB BLD-MCNC: 14.2 G/DL (ref 12–17)
HGB UR QL STRIP.AUTO: NEGATIVE
IMM GRANULOCYTES # BLD AUTO: 0.03 THOUSAND/UL (ref 0–0.2)
IMM GRANULOCYTES NFR BLD AUTO: 0 % (ref 0–2)
KETONES UR STRIP-MCNC: NEGATIVE MG/DL
LEUKOCYTE ESTERASE UR QL STRIP: NEGATIVE
LYMPHOCYTES # BLD AUTO: 1.5 THOUSANDS/ΜL (ref 0.6–4.47)
LYMPHOCYTES NFR BLD AUTO: 13 % (ref 14–44)
MCH RBC QN AUTO: 30.9 PG (ref 26.8–34.3)
MCHC RBC AUTO-ENTMCNC: 34.2 G/DL (ref 31.4–37.4)
MCV RBC AUTO: 90 FL (ref 82–98)
METHADONE UR QL: NEGATIVE
MONOCYTES # BLD AUTO: 0.71 THOUSAND/ΜL (ref 0.17–1.22)
MONOCYTES NFR BLD AUTO: 6 % (ref 4–12)
NEUTROPHILS # BLD AUTO: 8.44 THOUSANDS/ΜL (ref 1.85–7.62)
NEUTS SEG NFR BLD AUTO: 75 % (ref 43–75)
NITRITE UR QL STRIP: NEGATIVE
NRBC BLD AUTO-RTO: 0 /100 WBCS
OPIATES UR QL SCN: NEGATIVE
OXYCODONE+OXYMORPHONE UR QL SCN: NEGATIVE
PCP UR QL: NEGATIVE
PH UR STRIP.AUTO: 6.5 [PH]
PLATELET # BLD AUTO: 263 THOUSANDS/UL (ref 149–390)
PMV BLD AUTO: 10 FL (ref 8.9–12.7)
POTASSIUM SERPL-SCNC: 3.7 MMOL/L (ref 3.5–5.3)
PROT SERPL-MCNC: 7.9 G/DL (ref 6.4–8.2)
PROT UR STRIP-MCNC: NEGATIVE MG/DL
RBC # BLD AUTO: 4.6 MILLION/UL (ref 3.88–5.62)
SODIUM SERPL-SCNC: 140 MMOL/L (ref 136–145)
SP GR UR STRIP.AUTO: 1.02 (ref 1–1.03)
THC UR QL: NEGATIVE
TSH SERPL DL<=0.05 MIU/L-ACNC: 4.82 UIU/ML (ref 0.36–3.74)
UROBILINOGEN UR QL STRIP.AUTO: 0.2 E.U./DL
WBC # BLD AUTO: 11.34 THOUSAND/UL (ref 4.31–10.16)

## 2021-08-13 PROCEDURE — 81003 URINALYSIS AUTO W/O SCOPE: CPT | Performed by: EMERGENCY MEDICINE

## 2021-08-13 PROCEDURE — 99285 EMERGENCY DEPT VISIT HI MDM: CPT | Performed by: EMERGENCY MEDICINE

## 2021-08-13 PROCEDURE — 82077 ASSAY SPEC XCP UR&BREATH IA: CPT | Performed by: EMERGENCY MEDICINE

## 2021-08-13 PROCEDURE — 85025 COMPLETE CBC W/AUTO DIFF WBC: CPT | Performed by: EMERGENCY MEDICINE

## 2021-08-13 PROCEDURE — 96375 TX/PRO/DX INJ NEW DRUG ADDON: CPT

## 2021-08-13 PROCEDURE — 36415 COLL VENOUS BLD VENIPUNCTURE: CPT | Performed by: EMERGENCY MEDICINE

## 2021-08-13 PROCEDURE — 99223 1ST HOSP IP/OBS HIGH 75: CPT | Performed by: NURSE PRACTITIONER

## 2021-08-13 PROCEDURE — 93005 ELECTROCARDIOGRAM TRACING: CPT

## 2021-08-13 PROCEDURE — 99285 EMERGENCY DEPT VISIT HI MDM: CPT

## 2021-08-13 PROCEDURE — 80053 COMPREHEN METABOLIC PANEL: CPT | Performed by: EMERGENCY MEDICINE

## 2021-08-13 PROCEDURE — 80307 DRUG TEST PRSMV CHEM ANLYZR: CPT | Performed by: EMERGENCY MEDICINE

## 2021-08-13 PROCEDURE — 96374 THER/PROPH/DIAG INJ IV PUSH: CPT

## 2021-08-13 PROCEDURE — 84443 ASSAY THYROID STIM HORMONE: CPT | Performed by: EMERGENCY MEDICINE

## 2021-08-13 PROCEDURE — 96361 HYDRATE IV INFUSION ADD-ON: CPT

## 2021-08-13 RX ORDER — LEVOTHYROXINE SODIUM 175 UG/1
175 TABLET ORAL
Status: DISCONTINUED | OUTPATIENT
Start: 2021-08-14 | End: 2021-08-14 | Stop reason: HOSPADM

## 2021-08-13 RX ORDER — ACETAMINOPHEN 325 MG/1
650 TABLET ORAL EVERY 6 HOURS PRN
Status: DISCONTINUED | OUTPATIENT
Start: 2021-08-13 | End: 2021-08-14 | Stop reason: HOSPADM

## 2021-08-13 RX ORDER — FOLIC ACID 1 MG/1
1 TABLET ORAL DAILY
Status: DISCONTINUED | OUTPATIENT
Start: 2021-08-14 | End: 2021-08-14 | Stop reason: HOSPADM

## 2021-08-13 RX ORDER — CHLORDIAZEPOXIDE HYDROCHLORIDE 25 MG/1
25 CAPSULE, GELATIN COATED ORAL ONCE
Status: DISCONTINUED | OUTPATIENT
Start: 2021-08-13 | End: 2021-08-13

## 2021-08-13 RX ORDER — LANOLIN ALCOHOL/MO/W.PET/CERES
100 CREAM (GRAM) TOPICAL DAILY
Status: DISCONTINUED | OUTPATIENT
Start: 2021-08-14 | End: 2021-08-14 | Stop reason: HOSPADM

## 2021-08-13 RX ORDER — ARIPIPRAZOLE 2 MG/1
2 TABLET ORAL DAILY
Status: DISCONTINUED | OUTPATIENT
Start: 2021-08-14 | End: 2021-08-14 | Stop reason: HOSPADM

## 2021-08-13 RX ORDER — CHLORDIAZEPOXIDE HYDROCHLORIDE 25 MG/1
50 CAPSULE, GELATIN COATED ORAL 3 TIMES DAILY PRN
Qty: 30 CAPSULE | Refills: 0 | Status: SHIPPED | OUTPATIENT
Start: 2021-08-13 | End: 2021-08-14 | Stop reason: HOSPADM

## 2021-08-13 RX ORDER — SODIUM CHLORIDE 9 MG/ML
100 INJECTION, SOLUTION INTRAVENOUS CONTINUOUS
Status: DISCONTINUED | OUTPATIENT
Start: 2021-08-13 | End: 2021-08-14

## 2021-08-13 RX ORDER — DIAZEPAM 5 MG/ML
2.5 INJECTION, SOLUTION INTRAMUSCULAR; INTRAVENOUS ONCE
Status: DISCONTINUED | OUTPATIENT
Start: 2021-08-13 | End: 2021-08-13

## 2021-08-13 RX ORDER — ONDANSETRON 2 MG/ML
4 INJECTION INTRAMUSCULAR; INTRAVENOUS EVERY 6 HOURS PRN
Status: DISCONTINUED | OUTPATIENT
Start: 2021-08-13 | End: 2021-08-14 | Stop reason: HOSPADM

## 2021-08-13 RX ORDER — ONDANSETRON 2 MG/ML
4 INJECTION INTRAMUSCULAR; INTRAVENOUS ONCE
Status: COMPLETED | OUTPATIENT
Start: 2021-08-13 | End: 2021-08-13

## 2021-08-13 RX ORDER — CHLORDIAZEPOXIDE HYDROCHLORIDE 25 MG/1
25 CAPSULE, GELATIN COATED ORAL ONCE
Status: COMPLETED | OUTPATIENT
Start: 2021-08-13 | End: 2021-08-13

## 2021-08-13 RX ORDER — LORAZEPAM 2 MG/ML
1 INJECTION INTRAMUSCULAR ONCE
Status: COMPLETED | OUTPATIENT
Start: 2021-08-13 | End: 2021-08-13

## 2021-08-13 RX ADMIN — SODIUM CHLORIDE 1000 ML: 0.9 INJECTION, SOLUTION INTRAVENOUS at 20:16

## 2021-08-13 RX ADMIN — ONDANSETRON 4 MG: 2 INJECTION INTRAMUSCULAR; INTRAVENOUS at 21:20

## 2021-08-13 RX ADMIN — CHLORDIAZEPOXIDE HYDROCHLORIDE 25 MG: 25 CAPSULE ORAL at 21:19

## 2021-08-13 RX ADMIN — LORAZEPAM 1 MG: 2 INJECTION INTRAMUSCULAR; INTRAVENOUS at 20:25

## 2021-08-13 NOTE — ED PROVIDER NOTES
History  Chief Complaint   Patient presents with    Shaking     pt took 100mg naltrexone around 1700  pt also states he had approx 3 shots of vodka around 1500  c/o shakiness, nauseousness, and restlessness  denies any pain     Patient is a 51-year-old male presenting to the emergency room with wife for complaints diffuse body aches with shakes and nausea, patient is trying to stop drinking alcohol, is a daily consumer of alcohol in large quantities, generally drinks multiple shots or glasses of vodka throughout the day, states he did have 3 shots of vodka this morning, and then again around 3:00 p m , around 5 or 6:00 p m  he took a dose of naltrexone which was prescribed by his PCP to assist in alcohol dependence, since then he has had increased shaking, body aches, restlessness and discomfort, he reports nausea but no vomiting, has attempted to detox from alcohol in the past, no known history of alcohol withdrawal seizures, denies drug use, denies suicidal or homicidal thoughts or depression          Prior to Admission Medications   Prescriptions Last Dose Informant Patient Reported? Taking? ARIPiprazole (ABILIFY) 2 mg tablet   No No   Sig: TAKE 1 TABLET BY MOUTH ONCE DAILY  Buprenorphine ER (Sublocade) 300 MG/1 5ML SOSY   Yes No   Sig: Inject under the skin every 30 (thirty) days   FLUoxetine (PROzac) 40 MG capsule   No No   Sig: Take 1 capsule (40 mg total) by mouth daily   Sublocade 100 MG/0 5ML   Yes No   amitriptyline (ELAVIL) 25 mg tablet   No No   Sig: TAKE 1 TABLET BY MOUTH EACH EVENING AT BEDTIME    buprenorphine-naloxone (SUBOXONE) 8-2 mg   Yes No   Sig: Place 1 tablet under the tongue daily   levothyroxine 175 mcg tablet   No No   Sig: TAKE 1 TABLET BY MOUTH IN THE EARLY MORNING   sildenafil (REVATIO) 20 mg tablet   No No   Sig: TAKE ONE TABLET BY MOUTH ONCE DAILY AS NEEDED     terbinafine (LamISIL) 1 % cream   Yes No   Sig: Apply 1 application topically 2 (two) times a day Facility-Administered Medications: None       Past Medical History:   Diagnosis Date    Depressive disorder     Hypothyroidism        Past Surgical History:   Procedure Laterality Date    NOSE SURGERY      TONSILLECTOMY         Family History   Problem Relation Age of Onset   [facility] Breast cancer Mother     Kidney cancer Mother     Breast cancer Family     Kidney cancer Family      I have reviewed and agree with the history as documented  E-Cigarette/Vaping    E-Cigarette Use Never User      E-Cigarette/Vaping Substances     Social History     Tobacco Use    Smoking status: Former Smoker     Packs/day: 1 00     Years: 25 00     Pack years: 25 00     Types: Cigarettes     Quit date: 2015     Years since quittin 5    Smokeless tobacco: Never Used   Vaping Use    Vaping Use: Never used   Substance Use Topics    Alcohol use: Yes     Alcohol/week: 5 0 standard drinks     Types: 5 Shots of liquor per week     Comment: 5 shots daily    Drug use: Never       Review of Systems   Constitutional: Negative  HENT: Negative  Eyes: Negative  Respiratory: Negative  Cardiovascular: Negative  Gastrointestinal: Positive for nausea  Endocrine: Negative  Genitourinary: Negative  Musculoskeletal: Positive for myalgias  Skin: Negative  Allergic/Immunologic: Negative  Neurological: Negative  Hematological: Negative  Psychiatric/Behavioral: Negative  Restlessness, shaking, alcohol withdrawal       Physical Exam  Physical Exam  Constitutional:       Appearance: Normal appearance  He is well-developed  HENT:      Head: Normocephalic and atraumatic  Nose: Nose normal       Mouth/Throat:      Mouth: Mucous membranes are moist    Eyes:      Conjunctiva/sclera: Conjunctivae normal       Pupils: Pupils are equal, round, and reactive to light  Cardiovascular:      Rate and Rhythm: Normal rate     Pulmonary:      Effort: Pulmonary effort is normal    Abdominal: Palpations: Abdomen is soft  Musculoskeletal:         General: Normal range of motion  Cervical back: Normal range of motion and neck supple  Skin:     General: Skin is warm and dry  Neurological:      General: No focal deficit present  Mental Status: He is alert and oriented to person, place, and time  Mental status is at baseline     Psychiatric:      Comments: Restlessness         Vital Signs  ED Triage Vitals [08/13/21 2009]   Temperature Pulse Respirations Blood Pressure SpO2   98 °F (36 7 °C) 75 18 (!) 161/102 98 %      Temp Source Heart Rate Source Patient Position - Orthostatic VS BP Location FiO2 (%)   Temporal Monitor Sitting Right arm --      Pain Score       No Pain           Vitals:    08/13/21 2009 08/13/21 2115   BP: (!) 161/102 142/90   Pulse: 75 78   Patient Position - Orthostatic VS: Sitting Sitting         Visual Acuity      ED Medications  Medications   sodium chloride 0 9 % bolus 1,000 mL (0 mL Intravenous Stopped 8/13/21 2129)   LORazepam (ATIVAN) injection 1 mg (1 mg Intravenous Given 8/13/21 2025)   chlordiazePOXIDE (LIBRIUM) capsule 25 mg (25 mg Oral Given 8/13/21 2119)   ondansetron (ZOFRAN) injection 4 mg (4 mg Intravenous Given 8/13/21 2120)       Diagnostic Studies  Results Reviewed     Procedure Component Value Units Date/Time    Comprehensive metabolic panel [[id number]]  (Abnormal) Collected: 08/13/21 2023    Lab Status: Final result Specimen: Blood from Arm, Right Updated: 08/13/21 2119     Sodium 140 mmol/L      Potassium 3 7 mmol/L      Chloride 104 mmol/L      CO2 25 mmol/L      ANION GAP 11 mmol/L      BUN 16 mg/dL      Creatinine 0 78 mg/dL      Glucose 121 mg/dL      Calcium 8 0 mg/dL      AST 24 U/L      ALT 39 U/L      Alkaline Phosphatase 80 U/L      Total Protein 7 9 g/dL      Albumin 3 9 g/dL      Total Bilirubin 0 38 mg/dL      eGFR 105 ml/min/1 73sq m     Narrative:      [place] guidelines for Chronic Kidney Disease (CKD):    Stage 1 with normal or high GFR (GFR > 90 mL/min/1 73 square meters)    Stage 2 Mild CKD (GFR = 60-89 mL/min/1 73 square meters)    Stage 3A Moderate CKD (GFR = 45-59 mL/min/1 73 square meters)    Stage 3B Moderate CKD (GFR = 30-44 mL/min/1 73 square meters)    Stage 4 Severe CKD (GFR = 15-29 mL/min/1 73 square meters)    Stage 5 End Stage CKD (GFR <15 mL/min/1 73 square meters)  Note: GFR calculation is accurate only with a steady state creatinine    Ethanol [[id number]]  (Normal) Collected: 08/13/21 2023    Lab Status: Final result Specimen: Blood from Arm, Right Updated: 08/13/21 2119     Ethanol Lvl <3 mg/dL     TSH [[id number]]  (Abnormal) Collected: 08/13/21 2023    Lab Status: Final result Specimen: Blood from Arm, Right Updated: 08/13/21 2101     TSH 3RD GENERATON 4 823 uIU/mL     Narrative:      Patients undergoing fluorescein dye angiography may retain small amounts of fluorescein in the body for 48-72 hours post procedure  Samples containing fluorescein can produce falsely depressed TSH values  If the patient had this procedure,a specimen should be resubmitted post fluorescein clearance  Rapid drug screen, urine [[id number]]  (Normal) Collected: 08/13/21 2023    Lab Status: Final result Specimen: Urine, Clean Catch Updated: 08/13/21 2048     Amph/Meth UR Negative     Barbiturate Ur Negative     Benzodiazepine Urine Negative     Cocaine Urine Negative     Methadone Urine Negative     Opiate Urine Negative     PCP Ur Negative     THC Urine Negative     Oxycodone Urine Negative    Narrative:      FOR MEDICAL PURPOSES ONLY  IF CONFIRMATION NEEDED PLEASE CONTACT THE LAB WITHIN 5 DAYS      Drug Screen Cutoff Levels:  AMPHETAMINE/METHAMPHETAMINES  1000 ng/mL  BARBITURATES     200 ng/mL  BENZODIAZEPINES     200 ng/mL  COCAINE      300 ng/mL  METHADONE      300 ng/mL  OPIATES      300 ng/mL  PHENCYCLIDINE     25 ng/mL  THC       50 ng/mL  OXYCODONE      100 ng/mL    UA w Reflex to Microscopic w Reflex to Culture [[id number]] Collected: 08/13/21 2023    Lab Status: Final result Specimen: Urine, Clean Catch Updated: 08/13/21 2033     Color, UA Yellow     Clarity, UA Clear     Specific Gravity, UA 1 025     pH, UA 6 5     Leukocytes, UA Negative     Nitrite, UA Negative     Protein, UA Negative mg/dl      Glucose, UA Negative mg/dl      Ketones, UA Negative mg/dl      Urobilinogen, UA 0 2 E U /dl      Bilirubin, UA Negative     Blood, UA Negative    CBC and differential [[id number]]  (Abnormal) Collected: 08/13/21 2023    Lab Status: Final result Specimen: Blood from Arm, Right Updated: 08/13/21 2033     WBC 11 34 Thousand/uL      RBC 4 60 Million/uL      Hemoglobin 14 2 g/dL      Hematocrit 41 5 %      MCV 90 fL      MCH 30 9 pg      MCHC 34 2 g/dL      RDW 12 6 %      MPV 10 0 fL      Platelets 348 Thousands/uL      nRBC 0 /100 WBCs      Neutrophils Relative 75 %      Immat GRANS % 0 %      Lymphocytes Relative 13 %      Monocytes Relative 6 %      Eosinophils Relative 5 %      Basophils Relative 1 %      Neutrophils Absolute 8 44 Thousands/µL      Immature Grans Absolute 0 03 Thousand/uL      Lymphocytes Absolute 1 50 Thousands/µL      Monocytes Absolute 0 71 Thousand/µL      Eosinophils Absolute 0 59 Thousand/µL      Basophils Absolute 0 07 Thousands/µL                  No orders to display              Procedures  ECG 12 Lead Documentation Only    Date/Time: 8/13/2021 9:58 PM  Performed by: Val Hung DO  Authorized by: Val Hung DO     Indications / Diagnosis:  Alcohol withdrawal  ECG reviewed by me, the ED Provider: yes    Patient location:  ED  Previous ECG:     Previous ECG:  Unavailable    Comparison to cardiac monitor: Yes    Interpretation:     Interpretation: normal    Rate:     ECG rate:  80    ECG rate assessment: normal    Rhythm:     Rhythm: sinus rhythm    Ectopy:     Ectopy: PVCs      PVCs:  Infrequent  QRS:     QRS intervals:  Normal  Conduction:     Conduction: normal    ST segments: ST segments:  Normal  T waves:     T waves: inverted      Inverted:  III and V1             ED Course  ED Course as of Aug 13 2159   Fri Aug 13, 2021   2156 Laboratory findings discussed at bedside, relatively unremarkable, patient has had slight relief after receiving Librium but once again feeling jittery and restless, discussed treatment plan options for alcohol withdrawal, patient agreeable to admission for further evaluation and treatment, discussed with hospitalist service who are in agreement as well                                          Disposition  Final diagnoses:   Alcohol withdrawal syndrome without complication ([facility] [address] )   Alcohol withdrawal ([facility] [address] )     Time reflects when diagnosis was documented in both MDM as applicable and the Disposition within this note     Time User Action Codes Description Comment    8/13/2021  9:46 PM Keren Israel Add [F10 230] Alcohol withdrawal syndrome without complication ([facility] [address] )     2/63/7511  9:57 PM Wing Ramirez Add [F10 239] Alcohol withdrawal [facility])       ED Disposition     ED Disposition Condition Date/Time Comment    Admit Stable Fri Aug 13, 2021  9:57 PM Case was discussed with NP Mickie Adams and the patient's admission status was agreed to be Admission Status: inpatient status to the service of Dr David Alcantara   Follow-up Information     Follow up With Specialties Details Why Abigail Livingston MD Family Medicine  As needed [address]  [address] [place] [address],[facility] [address] [phone]            Patient's Medications   Discharge Prescriptions    CHLORDIAZEPOXIDE (LIBRIUM) 25 MG CAPSULE    Take 2 capsules (50 mg total) by mouth 3 (three) times a day as needed for withdrawal for up to 5 days       Start Date: 8/13/2021 End Date: 8/18/2021       Order Dose: 50 mg       Quantity: 30 capsule    Refills: 0     No discharge procedures on file      PDMP Review     None          ED Provider  Electronically Signed by           Bart Sierra DO  08/13/21 3001

## 2021-08-14 VITALS
SYSTOLIC BLOOD PRESSURE: 151 MMHG | RESPIRATION RATE: 18 BRPM | HEART RATE: 71 BPM | DIASTOLIC BLOOD PRESSURE: 78 MMHG | OXYGEN SATURATION: 96 % | TEMPERATURE: 99.1 F

## 2021-08-14 PROBLEM — F10.139 ALCOHOL ABUSE WITH WITHDRAWAL (HCC): Status: ACTIVE | Noted: 2021-08-13

## 2021-08-14 LAB
ANION GAP SERPL CALCULATED.3IONS-SCNC: 10 MMOL/L (ref 4–13)
BASOPHILS # BLD AUTO: 0.02 THOUSANDS/ΜL (ref 0–0.1)
BASOPHILS NFR BLD AUTO: 0 % (ref 0–1)
BUN SERPL-MCNC: 11 MG/DL (ref 5–25)
CALCIUM SERPL-MCNC: 8 MG/DL (ref 8.3–10.1)
CHLORIDE SERPL-SCNC: 107 MMOL/L (ref 100–108)
CO2 SERPL-SCNC: 24 MMOL/L (ref 21–32)
CREAT SERPL-MCNC: 0.76 MG/DL (ref 0.6–1.3)
EOSINOPHIL # BLD AUTO: 0.01 THOUSAND/ΜL (ref 0–0.61)
EOSINOPHIL NFR BLD AUTO: 0 % (ref 0–6)
ERYTHROCYTE [DISTWIDTH] IN BLOOD BY AUTOMATED COUNT: 12.5 % (ref 11.6–15.1)
GFR SERPL CREATININE-BSD FRML MDRD: 106 ML/MIN/1.73SQ M
GLUCOSE SERPL-MCNC: 122 MG/DL (ref 65–140)
HCT VFR BLD AUTO: 37.6 % (ref 36.5–49.3)
HGB BLD-MCNC: 12.7 G/DL (ref 12–17)
IMM GRANULOCYTES # BLD AUTO: 0.05 THOUSAND/UL (ref 0–0.2)
IMM GRANULOCYTES NFR BLD AUTO: 1 % (ref 0–2)
LYMPHOCYTES # BLD AUTO: 1.12 THOUSANDS/ΜL (ref 0.6–4.47)
LYMPHOCYTES NFR BLD AUTO: 11 % (ref 14–44)
MCH RBC QN AUTO: 30.3 PG (ref 26.8–34.3)
MCHC RBC AUTO-ENTMCNC: 33.8 G/DL (ref 31.4–37.4)
MCV RBC AUTO: 90 FL (ref 82–98)
MONOCYTES # BLD AUTO: 0.76 THOUSAND/ΜL (ref 0.17–1.22)
MONOCYTES NFR BLD AUTO: 8 % (ref 4–12)
NEUTROPHILS # BLD AUTO: 7.84 THOUSANDS/ΜL (ref 1.85–7.62)
NEUTS SEG NFR BLD AUTO: 80 % (ref 43–75)
NRBC BLD AUTO-RTO: 0 /100 WBCS
PLATELET # BLD AUTO: 231 THOUSANDS/UL (ref 149–390)
PMV BLD AUTO: 10 FL (ref 8.9–12.7)
POTASSIUM SERPL-SCNC: 3.6 MMOL/L (ref 3.5–5.3)
RBC # BLD AUTO: 4.19 MILLION/UL (ref 3.88–5.62)
SODIUM SERPL-SCNC: 141 MMOL/L (ref 136–145)
WBC # BLD AUTO: 9.8 THOUSAND/UL (ref 4.31–10.16)

## 2021-08-14 PROCEDURE — 85025 COMPLETE CBC W/AUTO DIFF WBC: CPT | Performed by: NURSE PRACTITIONER

## 2021-08-14 PROCEDURE — 80048 BASIC METABOLIC PNL TOTAL CA: CPT | Performed by: NURSE PRACTITIONER

## 2021-08-14 PROCEDURE — 99239 HOSP IP/OBS DSCHRG MGMT >30: CPT | Performed by: FAMILY MEDICINE

## 2021-08-14 RX ORDER — CHLORDIAZEPOXIDE HYDROCHLORIDE 5 MG/1
CAPSULE, GELATIN COATED ORAL
Qty: 14 CAPSULE | Refills: 0 | Status: SHIPPED | OUTPATIENT
Start: 2021-08-14 | End: 2021-09-03 | Stop reason: ALTCHOICE

## 2021-08-14 RX ORDER — LORAZEPAM 1 MG/1
2 TABLET ORAL ONCE
Status: COMPLETED | OUTPATIENT
Start: 2021-08-14 | End: 2021-08-14

## 2021-08-14 RX ADMIN — MULTIPLE VITAMINS W/ MINERALS TAB 1 TABLET: TAB at 08:08

## 2021-08-14 RX ADMIN — LORAZEPAM 2 MG: 1 TABLET ORAL at 03:02

## 2021-08-14 RX ADMIN — ARIPIPRAZOLE 2 MG: 2 TABLET ORAL at 08:08

## 2021-08-14 RX ADMIN — FOLIC ACID 1 MG: 1 TABLET ORAL at 08:08

## 2021-08-14 RX ADMIN — SODIUM CHLORIDE 100 ML/HR: 0.9 INJECTION, SOLUTION INTRAVENOUS at 00:01

## 2021-08-14 RX ADMIN — THIAMINE HCL TAB 100 MG 100 MG: 100 TAB at 08:08

## 2021-08-14 RX ADMIN — LEVOTHYROXINE SODIUM 175 MCG: 175 TABLET ORAL at 05:50

## 2021-08-14 RX ADMIN — SODIUM CHLORIDE 100 ML/HR: 0.9 INJECTION, SOLUTION INTRAVENOUS at 09:46

## 2021-08-14 NOTE — ASSESSMENT & PLAN NOTE
· Currently controlled  · Continue Prozac  · May resume his  · Supportive care  · Outpatient follow-up

## 2021-08-14 NOTE — ASSESSMENT & PLAN NOTE
Pt is a 27 year old woman who presents post partum timed 5 days for BP check.   S/p c section.     She is feeling well. She is taking motrin and Norco once a day.     Visit Vitals  LMP 09/09/2017 (Exact Date)       Bps are 148/100 and 140/98.     Normal BPs for her are 100/60-70 range.     Imp. Postpartum HTN.   Plan. Norvasc 5 mg daily, recheck this Friday.  She will let us know if she feels lightheaded after med.    · TSH 4 8  · Continue levothyroxine 175 mcg daily  · Continue outpatient follow-up

## 2021-08-14 NOTE — ASSESSMENT & PLAN NOTE
· History of drinking 3/4 of a fifth of vodka for 6 months  · History of withdrawal symptoms including restlessness, eye watering, and diarrhea-no history of withdrawal seizures  · Took naltrexone for 1st time today, and had about 1/4 of a 5th of vodka-is experiencing restlessness, discomfort  · Medical alcohol is negative, UDS is negative  · CIWA protocol  · Seizure precautions

## 2021-08-14 NOTE — DISCHARGE SUMMARY
[address]  Discharge- Lela Lundberg 1970, 48 y o  male MRN: [id number]  Unit/Bed#: -01 Encounter: [id number]  Primary Care Provider: Morena Elias MD   Date and time admitted to hospital: 8/13/2021  8:01 PM    Recurrent major depressive disorder, in partial remission ([facility] )  Assessment & Plan  · Currently controlled  · Continue Prozac  · May resume his  · Supportive care  · Outpatient follow-up    Acquired hypothyroidism  Assessment & Plan  · TSH 4 8  · Continue levothyroxine 175 mcg daily  · Continue outpatient follow-up  · Recheck TSH in 2 weeks post hospitalization    * Alcohol abuse with withdrawal ([facility] )  Assessment & Plan  · History of drinking 3/4 of a fifth of vodka for 6 months  · History of withdrawal symptoms including restlessness, eye watering, and diarrhea-no history of withdrawal seizures  · Took naltrexone for 1st time today, and had about 1/4 of a 5th of vodka-is experiencing restlessness, discomfort  · Medical alcohol is negative, UDS is negative  · CIWA protocol  · Seizure precautions  · Suspect patient had some withdrawals he received another dose of Ativan at midnight and he is doing so much better there is no shaking there is no tremors suspect drinking in combination with naltrexone with his alcohol being negative consistent with withdrawals  Discussed with the patient that for now though not take naltrexone and also will discharge him on Librium taper 10 mg b i d  For 2 days 10 daily for 2 days 5 daily for 2 days while on does avoid drinking          Medical Problems     Resolved Problems  Date Reviewed: 8/14/2021    None              Discharging Physician / Practitioner: Gregoria Olmstead MD  PCP: Morena Elias MD  Admission Date:   Admission Orders (From admission, onward)     Ordered        08/13/21 2158  Inpatient Admission  Once                   Discharge Date: 08/14/21    Consultations During Hospital Stay:  · nonone    Procedures Performed:   · none    Significant Findings / Test Results:   · none    Incidental Findings:   · none     Test Results Pending at Discharge (will require follow up):   · none     Outpatient Tests Requested:  · none    Complications:  none    Reason for Admission:  Restlessness    Hospital Course:   Stacy Lujan is a 48 y o  male patient who originally presented to the hospital on 8/13/2021 due to shaking all over and restlessness be shin drinks 5 shots of vodka daily  And also heating 100 mg of naltrexone while he was drinking and then it all started  And apparently he started to drink less alcohol than usual and he presented with rest and says of his arms and legs he was given 2 doses of Ativan Librium and since being in the hospital he feels better and all is resolved suspect this is all secondary to withdrawal +emanation of naltrexone drinking as the patient is back to baseline as he is going to not drink and states when he gets home will discharge him on couple of days of Librium taper 10 mg p o  B i d  x2 days and 10 mg daily for 2 days and then 5 mg daily for 2 days discussed follow-up with primary care electrolytes were normal     The patient, initially admitted to the hospital as inpatient, was discharged earlier than expected given the following: Improved earlier than expected  Please see above list of diagnoses and related plan for additional information  Condition at Discharge: stable    Discharge Day Visit / Exam:   Subjective:  Patient seen and examined feels much better no shakes  Vitals: Blood Pressure: 151/78 (08/14/21 0742)  Pulse: 71 (08/14/21 0742)  Temperature: 99 1 °F (37 3 °C) (08/14/21 0742)  Temp Source: Oral (08/14/21 0345)  Respirations: 18 (08/14/21 0742)  SpO2: 96 % (08/14/21 0742)  Exam:   Physical Exam  Vitals and nursing note reviewed  Constitutional:       Appearance: He is well-developed  HENT:      Head: Normocephalic and atraumatic     Eyes: Conjunctiva/sclera: Conjunctivae normal    Cardiovascular:      Rate and Rhythm: Normal rate and regular rhythm  Heart sounds: No murmur heard  Pulmonary:      Effort: Pulmonary effort is normal  No respiratory distress  Breath sounds: Normal breath sounds  Abdominal:      General: There is no distension  Palpations: Abdomen is soft  Tenderness: There is no abdominal tenderness  Musculoskeletal:         General: No swelling  Cervical back: Neck supple  Skin:     General: Skin is warm and dry  Neurological:      General: No focal deficit present  Mental Status: He is alert and oriented to person, place, and time  Mental status is at baseline  Psychiatric:         Mood and Affect: Mood normal          Discussion with Family: Patient declined call to   Patient called and spoke to his wife himself    Discharge instructions/Information to patient and family:   See after visit summary for information provided to patient and family  Provisions for Follow-Up Care:  See after visit summary for information related to follow-up care and any pertinent home health orders  Disposition:   Home    Planned Readmission: no     Discharge Statement:  I spent >35 minutes discharging the patient  This time was spent on the day of discharge  I had direct contact with the patient on the day of discharge  Greater than 50% of the total time was spent examining patient, answering all patient questions, arranging and discussing plan of care with patient as well as directly providing post-discharge instructions  Additional time then spent on discharge activities  Discharge Medications:  See after visit summary for reconciled discharge medications provided to patient and/or family        **Please Note: This note may have been constructed using a voice recognition system**

## 2021-08-14 NOTE — ASSESSMENT & PLAN NOTE
· Currently controlled  · Continue Prozac  · Will hold Elavil while on CIWA protocol  · Supportive care  · Outpatient follow-up

## 2021-08-14 NOTE — ASSESSMENT & PLAN NOTE
· History of drinking 3/4 of a fifth of vodka for 6 months  · History of withdrawal symptoms including restlessness, eye watering, and diarrhea-no history of withdrawal seizures  · Took naltrexone for 1st time today, and had about 1/4 of a 5th of vodka-is experiencing restlessness, discomfort  · Medical alcohol is negative, UDS is negative  · [facility] protocol  · Seizure precautions  · Suspect patient had some withdrawals he received another dose of Ativan at midnight and he is doing so much better there is no shaking there is no tremors suspect drinking in combination with naltrexone with his alcohol being negative consistent with withdrawals  Discussed with the patient that for now though not take naltrexone and also will discharge him on Librium taper 10 mg b i d  For 2 days 10 daily for 2 days 5 daily for 2 days while on does avoid drinking

## 2021-08-14 NOTE — PLAN OF CARE
Problem: SAFETY ADULT  Goal: Patient will remain free of falls  Description: INTERVENTIONS:  - Educate patient/family on patient safety including physical limitations  - Instruct patient to call for assistance with activity   - Consult OT/PT to assist with strengthening/mobility   - Keep Call bell within reach  - Keep bed low and locked with side rails adjusted as appropriate  - Keep care items and personal belongings within reach  - Initiate and maintain comfort rounds  - Make Fall Risk Sign visible to staff  - Offer Toileting every 2 Hours, in advance of need  - Initiate/Maintain bed alarm  - Obtain necessary fall risk management equipment: non skid socks  - Apply yellow socks and bracelet for high fall risk patients  - Consider moving patient to room near nurses station  Outcome: Progressing

## 2021-08-14 NOTE — CASE MANAGEMENT
Case Management Progress Note    Patient name Catarina Galvan  Location /-20 MRN [id number]  : 1970 Date 2021       LOS (days): 1  Geometric Mean LOS (GMLOS) (days):   Days to GMLOS:        BUNDLE:      OBJECTIVE:  Pt is a 48y o  year old /[employer], white or  [1], male with Ep[employer]opalian preference of Catholic admitted on 2021  8:01 PM  Pt is admitted to [phone] at [address] with complaints of Alcohol abuse with unspecified alcohol-induced disorder ([facility] [address] )   Current admission status: Inpatient  Preferred Pharmacy:   [address] [place] [address],  [facility] [address]  Phone: [phone] Fax: [phone]    [address], [address] [facility]  [address] [place] [facility] [address]  Phone: [phone] Fax: [phone] [facility], [address] [facility]  [address] [address]  Phone: [phone] Fax: [phone]    Primary Care Provider: Mateo Lai MD    Primary Insurance: BLUE CROSS  Secondary Insurance:     PROGRESS NOTE:    CM reviewed chart, discussed Pt w/ MD MCKEON will be discharging Pt today  CM met briefly w/ Pt  Pt confirms he is managed on Naltrexone for alcohol cessation by his PCP  Pt declined any additional resources  Pt is connected w/ AA and has a sponsor  Pt reports his wife will be picking him up today

## 2021-08-14 NOTE — ASSESSMENT & PLAN NOTE
· TSH 4 8  · Continue levothyroxine 175 mcg daily  · Continue outpatient follow-up  · Recheck TSH in 2 weeks post hospitalization

## 2021-08-14 NOTE — UTILIZATION REVIEW
Inpatient Admission Authorization Request   NOTIFICATION OF INPATIENT ADMISSION/INPATIENT AUTHORIZATION REQUEST   SERVICING FACILITY:   [address] [place] [address]  Shanelle Lee [address] [facility], [address]  Tax ID: [id number]  NPI: [id number]  Place of Service: Inpatient [facility]  [address]  Place of Service Code: 24     ATTENDING PROVIDER:  Attending Name and NPI#: Kaitlin Javed Caro [[id number]]  Address: Shanelle Lee [address] [place] [address], [address]  Phone: [phone]     UTILIZATION REVIEW CONTACT:  Yi Garcia, Utilization   Network Utilization Review Department  Phone: [phone]  Fax [phone]  Email: [email or web address]     PHYSICIAN ADVISORY SERVICES:  FOR [id number] REVIEW - MEDICAL NECESSITY DENIAL  Phone: [phone]  Fax: [phone]  Email: [email or web address]     TYPE OF REQUEST:  Inpatient Status     ADMISSION INFORMATION:  ADMISSION DATE/TIME: 8/13/21  9:58 PM  PATIENT DIAGNOSIS CODE/DESCRIPTION:  Alcohol withdrawal ([address] ) [F10 239]  Weakness [R53 1]  Alcohol withdrawal syndrome without complication ([facility] [address] ) [U40 144]  DISCHARGE DATE/TIME: 8/14/2021 12:01 PM  DISCHARGE DISPOSITION (IF DISCHARGED): Home/Self Care     IMPORTANT INFORMATION:  Please contact the Yi Garcia directly with any questions or concerns regarding this request  Department voicemails are confidential     Send requests for admission clinical reviews, concurrent reviews, approvals, and administrative denials due to lack of clinical to fax [phone]

## 2021-08-14 NOTE — H&P
[address]  H&P- Evelia Rice 1970, 48 y o  male MRN: [id number]  Unit/Bed#: -01 Encounter: [id number]  Primary Care Provider: Iftikhar Roldan MD   Date and time admitted to hospital: 8/13/2021  8:01 PM    * Alcohol abuse with unspecified alcohol-induced disorder [facility])  Assessment & Plan  · History of drinking 3/4 of a fifth of vodka for 6 months  · History of withdrawal symptoms including restlessness, eye watering, and diarrhea-no history of withdrawal seizures  · Took naltrexone for 1st time today, and had about 1/4 of a 5th of vodka-is experiencing restlessness, discomfort  · Medical alcohol is negative, UDS is negative  · CIWA protocol  · Seizure precautions    Recurrent major depressive disorder, in partial remission ([facility] [address] )  Assessment & Plan  · Currently controlled  · Continue Prozac  · Will hold Elavil while on CIWA protocol  · Supportive care  · Outpatient follow-up    Acquired hypothyroidism  Assessment & Plan  · TSH 4 8  · Continue levothyroxine 175 mcg daily  · Continue outpatient follow-up    VTE Prophylaxis: Pharmacologic VTE Prophylaxis contraindicated due to VTE score 2  / reason for no mechanical VTE prophylaxis VTE score 2   Code Status:  Full  POLST: POLST form is not discussed and not completed at this time  Discussion with family:  Patient    Anticipated Length of Stay:  Patient will be admitted on an Inpatient basis with an anticipated length of stay of  greater than 2 midnights  Justification for Hospital Stay:  Per plan above    Total Time for Visit, including Counseling / Coordination of Care: 30 minutes  Greater than 50% of this total time spent on direct patient counseling and coordination of care  Chief Complaint:   Restlessness    History of Present Illness:    Evelia Rice is a 48 y o  male with history of depression and hypothyroidism who presents with restlessness    He says that he has been drinking almost a 5th of vodka for 6 months  Today he started naltrexone as prescribed by his primary care, and he has significantly less alcohol than usual   He is reporting restlessness of arms and legs and watery eyes  He says this is similar to other times when he has decreased his alcohol intake  He denies any seizure history  He denies fever or chills, nasal congestion, visual disturbance, dysphagia, chest pain, shortness of breath, abdominal pain, dysuria, flank pain, back pain, dizziness, or syncope  Review of Systems:    Review of Systems   Constitutional: Negative for chills and fever  HENT: Negative for congestion  Eyes: Negative for visual disturbance  Respiratory: Negative for cough and shortness of breath  Cardiovascular: Negative for chest pain, palpitations and leg swelling  Gastrointestinal: Negative for abdominal distention, abdominal pain, blood in stool, constipation, diarrhea, nausea and vomiting  Genitourinary: Negative for dysuria and flank pain  Musculoskeletal: Negative for arthralgias and myalgias  Skin: Negative for pallor and rash  Neurological: Negative for dizziness, seizures, syncope, weakness, numbness and headaches  Psychiatric/Behavioral: Positive for agitation  Negative for suicidal ideas  The patient is hyperactive  All other systems reviewed and are negative  Past Medical and Surgical History:     Past Medical History:   Diagnosis Date    Depressive disorder     Hypothyroidism        Past Surgical History:   Procedure Laterality Date    NOSE SURGERY      TONSILLECTOMY         Meds/Allergies:    Prior to Admission medications    Medication Sig Start Date End Date Taking? Authorizing Provider   amitriptyline (ELAVIL) 25 mg tablet TAKE 1 TABLET BY MOUTH EACH EVENING AT BEDTIME  1/6/21   Audrey Marinelli MD   ARIPiprazole (ABILIFY) 2 mg tablet TAKE 1 TABLET BY MOUTH ONCE DAILY   7/26/21   Audrey Marinelli MD   Buprenorphine ER (Sublocade) 300 MG/1 5ML SOSY Inject under the skin every 30 (thirty) days    Historical Provider, MD   buprenorphine-naloxone (SUBOXONE) 8-2 mg Place 1 tablet under the tongue daily    Historical Provider, MD   chlordiazePOXIDE (LIBRIUM) 25 mg capsule Take 2 capsules (50 mg total) by mouth 3 (three) times a day as needed for withdrawal for up to 5 days 21  Liliya Napier DO   FLUoxetine (PROzac) 40 MG capsule Take 1 capsule (40 mg total) by mouth daily 20   Galilea Gorman MD   levothyroxine 175 mcg tablet TAKE 1 TABLET BY MOUTH IN THE EARLY MORNING 21   LILI Barton   sildenafil (REVATIO) 20 mg tablet TAKE ONE TABLET BY MOUTH ONCE DAILY AS NEEDED  21   Galilea Gorman MD   Sublocade 100 MG/0 5ML  20   Historical Provider, MD   terbinafine (LamISIL) 1 % cream Apply 1 application topically 2 (two) times a day 19  Historical Provider, MD     I have reviewed home medications with patient personally      Allergies: No Known Allergies    Social History:     Marital Status: /Civil Union   Occupation:  [employer]  Patient Pre-hospital Living Situation:  Home with wife  Patient Pre-hospital Level of Mobility:  Independent  Patient Pre-hospital Diet Restrictions:  None  Substance Use History:   Social History     Substance and Sexual Activity   Alcohol Use Yes    Alcohol/week: 5 0 standard drinks    Types: 5 Shots of liquor per week    Comment: 5 shots daily     Social History     Tobacco Use   Smoking Status Former Smoker    Packs/day: 1 00    Years: 25 00    Pack years: 25 00    Types: Cigarettes    Quit date: 2015    Years since quittin 5   Smokeless Tobacco Never Used     Social History     Substance and Sexual Activity   Drug Use Never       Family History:    non-contributory    Physical Exam:     Vitals:   Blood Pressure: 147/89 (21)  Pulse: 89 (21)  Temperature: 98 4 °F (36 9 °C) (21)  Temp Source: Oral (21 2245)  Respirations: 16 (08/13/21 2245)  SpO2: 97 % (08/13/21 2245)    Physical Exam  Vitals and nursing note reviewed  Constitutional:       Comments: Restless, irritable, frequently moving arms and legs   HENT:      Head: Normocephalic and atraumatic  Mouth/Throat:      Mouth: Mucous membranes are moist       Pharynx: Oropharynx is clear  Eyes:      Extraocular Movements: Extraocular movements intact  Pupils: Pupils are equal, round, and reactive to light  Cardiovascular:      Rate and Rhythm: Normal rate and regular rhythm  Pulses: Normal pulses  Heart sounds: Normal heart sounds  Pulmonary:      Effort: Pulmonary effort is normal       Breath sounds: Normal breath sounds  Abdominal:      General: Bowel sounds are normal       Palpations: Abdomen is soft  Tenderness: There is no abdominal tenderness  Musculoskeletal:         General: Normal range of motion  Cervical back: Normal range of motion and neck supple  Skin:     General: Skin is warm and dry  Capillary Refill: Capillary refill takes less than 2 seconds  Neurological:      General: No focal deficit present  Mental Status: He is alert and oriented to person, place, and time  Motor: No tremor  Psychiatric:         Behavior: Behavior is agitated and hyperactive  Thought Content: Thought content does not include suicidal ideation  Additional Data:     Lab Results: I have personally reviewed pertinent reports        Results from last 7 days   Lab Units 08/13/21 2023   WBC Thousand/uL 11 34*   HEMOGLOBIN g/dL 14 2   HEMATOCRIT % 41 5   PLATELETS Thousands/uL 263   NEUTROS PCT % 75   LYMPHS PCT % 13*   MONOS PCT % 6   EOS PCT % 5     Results from last 7 days   Lab Units 08/13/21 2023   SODIUM mmol/L 140   POTASSIUM mmol/L 3 7   CHLORIDE mmol/L 104   CO2 mmol/L 25   BUN mg/dL 16   CREATININE mg/dL 0 78   ANION GAP mmol/L 11   CALCIUM mg/dL 8 0*   ALBUMIN g/dL 3 9   TOTAL BILIRUBIN mg/dL 0 38   ALK PHOS U/L 80   ALT U/L 39   AST U/L 24   GLUCOSE RANDOM mg/dL 121                       Imaging: I have personally reviewed pertinent reports  No orders to display       EKG, Pathology, and Other Studies Reviewed on Admission:   · EKG:  NSR rate of 80    Allscripts / Epic Records Reviewed: Yes     ** Please Note: This note has been constructed using a voice recognition system   **

## 2021-08-16 ENCOUNTER — TRANSITIONAL CARE MANAGEMENT (OUTPATIENT)
Dept: FAMILY MEDICINE CLINIC | Facility: CLINIC | Age: 51
End: 2021-08-16

## 2021-08-16 NOTE — UTILIZATION REVIEW
Inpatient Admission Authorization Request   NOTIFICATION OF INPATIENT ADMISSION/INPATIENT AUTHORIZATION REQUEST   SERVICING FACILITY:   [address] [place] [address]  Shanelle Lee [address] [place] [address], [address]  Tax ID: [id number]  NPI: [id number]  Place of Service: Inpatient [address] [facility]  [address]  Place of Service Code: 24     ATTENDING PROVIDER:  Attending Name and NPI#: Elina Douglas, Amadeo Herve Durham [[id number]]  Address: Shanelle Lee [address] [place] [address], [facility] [address] Mcgowan  Phone: [phone]     UTILIZATION REVIEW CONTACT:  Glenny Stovall Utilization   Network Utilization Review Department  Phone: [phone]  Fax [phone]  Email: [email or web address]     PHYSICIAN ADVISORY SERVICES:  FOR [id number] REVIEW - MEDICAL NECESSITY DENIAL  Phone: [phone]  Fax: [phone]  Email: [email or web address]     TYPE OF REQUEST:  Inpatient Status     ADMISSION INFORMATION:  ADMISSION DATE/TIME: 8/13/21  9:58 PM  PATIENT DIAGNOSIS CODE/DESCRIPTION:  Alcohol withdrawal ([facility] [address] ) [F10 239]  Weakness [R53 1]  Alcohol withdrawal syndrome without complication ([facility] [address] ) [Y79 797]  DISCHARGE DATE/TIME:   DISCHARGE DISPOSITION (IF DISCHARGED):      IMPORTANT INFORMATION:  Please contact the Glenny Stovall directly with any questions or concerns regarding this request  Department voicemails are confidential     Send requests for admission clinical reviews, concurrent reviews, approvals, and administrative denials due to lack of clinical to fax [phone]

## 2021-08-16 NOTE — UTILIZATION REVIEW
Initial Clinical Review    Admission: Date/Time/Statement:   Admission Orders (From admission, onward)     Ordered        08/13/21 2158  Inpatient Admission  Once                   Orders Placed This Encounter   Procedures    Inpatient Admission     Standing Status:   Standing     Number of Occurrences:   1     Order Specific Question:   Level of Care     Answer:   Med Surg [16]     Order Specific Question:   Estimated length of stay     Answer:   More than 2 Midnights     Order Specific Question:   Certification     Answer:   I certify that inpatient services are medically necessary for this patient for a duration of greater than two midnights  See H&P and MD Progress Notes for additional information about the patient's course of treatment  ED Arrival Information     Expected Arrival Acuity    - 8/13/2021 19:32 Urgent         Means of arrival Escorted by Service Admission type    JEREMIAH DE LA CRUZ  [facility] Hospitalist Urgent         Arrival complaint    general weakness, shaking        Chief Complaint   Patient presents with    Shaking     pt took 100mg naltrexone around 1700  pt also states he had approx 3 shots of vodka around 1500  c/o shakiness, nauseousness, and restlessness  denies any pain       Initial Presentation:48year old male to the ED from home with complaints of shaking, generalized weakness which started about 3 hours prior to arrival  Admitted to inpatient for alcohol abuse with alcohol induced disorder  Had been drinking throughout the day, prior to his arrival, then took naltraxone  Since taking that, he started with shaking, body aches, discomfort  Arrives with nausea, body aches, restlessness, shaking, appearing to be in withdrawal  Given Librium in the ED with some improvement, but then started again with shaking and jitters  Hold Elavil on CiWA protocol  Date: 8/16    Day 2:    CIWA protocol, Seizure precautions  Tremors and shaking has subsided      ED Triage Vitals [08/13/21 2009] Temperature Pulse Respirations Blood Pressure SpO2   98 °F (36 7 °C) 75 18 (!) 161/102 98 %      Temp Source Heart Rate Source Patient Position - Orthostatic VS BP Location FiO2 (%)   Temporal Monitor Sitting Right arm --      Pain Score       No Pain          Wt Readings from Last 1 Encounters:   02/11/21 96 2 kg (212 lb)     Additional Vital Signs:   Date/Time  Temp Pulse Resp BP MAP (mmHg) SpO2 O2 Device Patient Position - Orthostatic VS   08/14/21 0900  -- -- -- -- -- -- None (Room air) --   08/14/21 07:42:32  99 1 °F (37 3 °C) 71 18 151/78 102 96 % -- --   08/14/21 0345  97 7 °F (36 5 °C) 63 20 114/44Abnormal  67 96 % None (Room air) Lying   08/14/21 0245  97 6 °F (36 4 °C) 86 20 138/80 99 97 % None (Room air) Lying   08/13/21 2245  98 4 °F (36 9 °C) 89 16 147/89 108 97 % -- Lying   08/13/21 2115  -- 78 20 142/90 -- 97 % None (Room air) Sitting   08/13/21 2100  -- -- -- -- -- -- None (Room air) --   08/13/21 2009  98 °F (36 7 °C) 75 18 161/102Abnormal  -- 98 % None (Room air) Sitting       CIWA 1-3  Pertinent Labs/Diagnostic Test Results:   8/13 EKG:  Interpretation:     Interpretation: normal     Rate:     ECG rate:  80     ECG rate assessment: normal     Rhythm:     Rhythm: sinus rhythm     Ectopy:     Ectopy: PVCs       PVCs:  Infrequent   QRS:     QRS intervals:  Normal   Conduction:     Conduction: normal     ST segments:     ST segments:  Normal   T waves:     T waves: inverted       Inverted:  III and V1      Results from last 7 days   Lab Units 08/14/21 0548 08/13/21 2023   WBC Thousand/uL 9 80 11 34*   HEMOGLOBIN g/dL 12 7 14 2   HEMATOCRIT % 37 6 41 5   PLATELETS Thousands/uL 231 263   NEUTROS ABS Thousands/µL 7 84* 8 44*         Results from last 7 days   Lab Units 08/14/21 0548 08/13/21 2023   SODIUM mmol/L 141 140   POTASSIUM mmol/L 3 6 3 7   CHLORIDE mmol/L 107 104   CO2 mmol/L 24 25   ANION GAP mmol/L 10 11   BUN mg/dL 11 16   CREATININE mg/dL 0 76 0 78   EGFR ml/min/1 73sq m 106 105 CALCIUM mg/dL 8 0* 8 0*     Results from last 7 days   Lab Units 08/13/21 2023   AST U/L 24   ALT U/L 39   ALK PHOS U/L 80   TOTAL PROTEIN g/dL 7 9   ALBUMIN g/dL 3 9   TOTAL BILIRUBIN mg/dL 0 38         Results from last 7 days   Lab Units 08/14/21  0548 08/13/21 2023   GLUCOSE RANDOM mg/dL 122 121       Results from last 7 days   Lab Units 08/13/21 2023   TSH 3RD GENERATON uIU/mL 4 823*     Results from last 7 days   Lab Units 08/13/21 2023   CLARITY UA  Clear   COLOR UA  Yellow   SPEC GRAV UA  1 025   PH UA  6 5   GLUCOSE UA mg/dl Negative   KETONES UA mg/dl Negative   BLOOD UA  Negative   PROTEIN UA mg/dl Negative   NITRITE UA  Negative   BILIRUBIN UA  Negative   UROBILINOGEN UA E U /dl 0 2   LEUKOCYTES UA  Negative     Results from last 7 days   Lab Units 08/13/21 2023   AMPH/METH  Negative   BARBITURATE UR  Negative   BENZODIAZEPINE UR  Negative   COCAINE UR  Negative   METHADONE URINE  Negative   OPIATE UR  Negative   PCP UR  Negative   THC UR  Negative     Results from last 7 days   Lab Units 08/13/21 2023   ETHANOL LVL mg/dL <3     ED Treatment:   Medication Administration from 08/13/2021 1930 to 08/13/2021 2245       Date/Time Order Dose Route Action     08/13/2021 2016 sodium chloride 0 9 % bolus 1,000 mL 1,000 mL Intravenous New Bag     08/13/2021 2025 LORazepam (ATIVAN) injection 1 mg 1 mg Intravenous Given     08/13/2021 2119 chlordiazePOXIDE (LIBRIUM) capsule 25 mg 25 mg Oral Given     08/13/2021 2120 ondansetron (ZOFRAN) injection 4 mg 4 mg Intravenous Given        Past Medical History:   Diagnosis Date    Depressive disorder     Hypothyroidism        Admitting Diagnosis: Alcohol withdrawal ([facility] [address] ) [F10 239]  Weakness [R53 1]  Alcohol withdrawal syndrome without complication ([facility] ) [F16 581]  Age/Sex: 48 y o  male  Admission Orders:  MICHAELWA  Scheduled Medications:  Abilify PO, Librium po, valium iv prn, folic acid, ativan IV, Ativan po, NS 1000 ml bolus, zofran    IP CONSULT TO CASE MANAGEMENT    Network Utilization Review Department  ATTENTION: Please call with any questions or concerns to [phone] and carefully listen to the prompts so that you are directed to the right person  All voicemails are confidential   Darcy Bourne all requests for admission clinical reviews, approved or denied determinations and any other requests to dedicated fax number below belonging to the campus where the patient is receiving treatment   List of dedicated fax numbers for the Facilities:  [address] DENIALS (Administrative/Medical Necessity) [phone]   [address] (Maternity/NICU/Pediatrics) [phone]   [address] [address] [place] [address] [address] [place] [address] [phone]

## 2021-08-17 LAB
ATRIAL RATE: 80 BPM
P AXIS: 61 DEGREES
PR INTERVAL: 160 MS
QRS AXIS: 56 DEGREES
QRSD INTERVAL: 88 MS
QT INTERVAL: 392 MS
QTC INTERVAL: 452 MS
T WAVE AXIS: 30 DEGREES
VENTRICULAR RATE: 80 BPM

## 2021-08-17 PROCEDURE — 93010 ELECTROCARDIOGRAM REPORT: CPT | Performed by: INTERNAL MEDICINE

## 2021-08-19 ENCOUNTER — OFFICE VISIT (OUTPATIENT)
Dept: FAMILY MEDICINE CLINIC | Facility: CLINIC | Age: 51
End: 2021-08-19
Payer: COMMERCIAL

## 2021-08-19 VITALS
OXYGEN SATURATION: 95 % | DIASTOLIC BLOOD PRESSURE: 70 MMHG | HEIGHT: 69 IN | SYSTOLIC BLOOD PRESSURE: 120 MMHG | HEART RATE: 84 BPM | WEIGHT: 204 LBS | BODY MASS INDEX: 30.21 KG/M2

## 2021-08-19 DIAGNOSIS — F19.11 HISTORY OF SUBSTANCE ABUSE (HCC): Chronic | ICD-10-CM

## 2021-08-19 DIAGNOSIS — F33.41 RECURRENT MAJOR DEPRESSIVE DISORDER, IN PARTIAL REMISSION (HCC): ICD-10-CM

## 2021-08-19 DIAGNOSIS — R63.5 WEIGHT GAIN, ABNORMAL: ICD-10-CM

## 2021-08-19 DIAGNOSIS — F10.139 ALCOHOL ABUSE WITH WITHDRAWAL (HCC): Primary | ICD-10-CM

## 2021-08-19 DIAGNOSIS — E03.9 ACQUIRED HYPOTHYROIDISM: Chronic | ICD-10-CM

## 2021-08-19 PROCEDURE — 99496 TRANSJ CARE MGMT HIGH F2F 7D: CPT | Performed by: FAMILY MEDICINE

## 2021-08-19 PROCEDURE — 1111F DSCHRG MED/CURRENT MED MERGE: CPT | Performed by: FAMILY MEDICINE

## 2021-08-19 RX ORDER — QUETIAPINE FUMARATE 25 MG/1
25 TABLET, FILM COATED ORAL
Qty: 30 TABLET | Refills: 5 | Status: SHIPPED | OUTPATIENT
Start: 2021-08-19 | End: 2021-09-02 | Stop reason: SDUPTHER

## 2021-08-19 RX ORDER — FLUTICASONE PROPIONATE 50 MCG
SPRAY, SUSPENSION (ML) NASAL
COMMUNITY
Start: 2021-08-12 | End: 2021-08-26

## 2021-08-19 NOTE — ASSESSMENT & PLAN NOTE
Actually seems to be doing well at this time  Continue follow-up counseling and finish the taper on the Librium  Had turned in the naloxone at the hospital and should not take this again  Not sure what was actually in    Reviewed hospital reports and reconciled medication

## 2021-08-19 NOTE — PATIENT INSTRUCTIONS
Reviewed hospital reports and reconciled medication  Patient is having trouble with sleep and will switch from Abilify over to Seroquel at 25 mg  After for 5 days if still having trouble sleeping increase to 50 mg    Continue the counseling and finish out the taper for the Librium

## 2021-08-19 NOTE — ASSESSMENT & PLAN NOTE
Discussed problem  Will switch the Abilify to 25 mg of Seroquel  After for 5 days if still not sleeping increase to 50 mg    Continue the Prozac at this time

## 2021-08-19 NOTE — PROGRESS NOTES
Assessment/Plan:     Alcohol abuse with withdrawal ([facility] )   Actually seems to be doing well at this time  Continue follow-up counseling and finish the taper on the Librium  Had turned in the naloxone at the hospital and should not take this again  Not sure what was actually in  Reviewed hospital reports and reconciled medication    Recurrent major depressive disorder, in partial remission ([facility] )   Discussed problem  Will switch the Abilify to 25 mg of Seroquel  After for 5 days if still not sleeping increase to 50 mg  Continue the Prozac at this time    Acquired hypothyroidism   Has been stable, continue current regimen    History of substance abuse ([facility] )   Continue follow-up counseling    Weight gain, abnormal   Is going to weight watchers with his wife       Diagnoses and all orders for this visit:    Alcohol abuse with withdrawal ([facility] )    Recurrent major depressive disorder, in partial remission ([facility] )  -     QUEtiapine (SEROquel) 25 mg tablet; Take 1 tablet (25 mg total) by mouth daily at bedtime    Acquired hypothyroidism    History of substance abuse ([facility])    Weight gain, abnormal    Other orders  -     Cancel: Hepatitis C Antibody ([facility]); Future  -     Cancel: CT lung screening program; Future  -     fluticasone (FLONASE) 50 mcg/act nasal spray; instill 2 sprays into each nostril once daily for 14 days         Subjective:     Patient ID: Shreya Hinton is a 48 y o  male  Patient has history of hypothyroidism, substance abuse, erectile dysfunction and depression  Has been having recurring problems with pain in the left femoral cutaneous nerve  This had responded well to amitriptyline which he is intermittently taking  Has been off the Suboxone for 6 months  Had been drinking vodka about 5 times a week and had gone on the Internet to get naloxone  Took 1 dose and had severe reaction    Went to the hospital for alcohol withdrawal   Is currently on tapering course of Librium and seen today for transition of care  Has been having trouble with sleeping waking up in the middle night with difficulty getting back to sleep  Has question about using alternative to Abilify such as Seroquel      Review of Systems   Constitutional: Negative for activity change, appetite change, chills, fatigue, fever and unexpected weight change  HENT: Negative for congestion, trouble swallowing and voice change  Eyes: Negative for visual disturbance  Respiratory: Negative for apnea, cough, chest tightness and shortness of breath  Cardiovascular: Negative for chest pain, palpitations and leg swelling  Gastrointestinal: Negative for abdominal distention, abdominal pain, constipation and diarrhea  Endocrine: Negative for polyuria ( nocturia times 0-1)  Genitourinary: Negative for difficulty urinating  Musculoskeletal: Negative for arthralgias and myalgias  Skin: Negative for rash  Allergic/Immunologic: Positive for environmental allergies  Neurological: Negative for dizziness, weakness, light-headedness, numbness and headaches  Hematological: Negative for adenopathy  Psychiatric/Behavioral: Positive for sleep disturbance  Negative for agitation and confusion  Objective:     Physical Exam  Vitals and nursing note reviewed  Constitutional:       General: He is not in acute distress  Appearance: Normal appearance  He is well-developed  HENT:      Head: Normocephalic  Right Ear: Tympanic membrane, ear canal and external ear normal       Left Ear: Tympanic membrane, ear canal and external ear normal       Nose: Nose normal       Mouth/Throat:      Mouth: Mucous membranes are moist    Eyes:      Extraocular Movements: Extraocular movements intact  Conjunctiva/sclera: Conjunctivae normal       Pupils: Pupils are equal, round, and reactive to light  Neck:      Thyroid: No thyromegaly  Cardiovascular:      Rate and Rhythm: Normal rate and regular rhythm        Heart sounds: Normal heart sounds  No murmur heard  Pulmonary:      Effort: Pulmonary effort is normal       Breath sounds: Normal breath sounds  Abdominal:      General: Abdomen is flat  There is no distension  Palpations: Abdomen is soft  There is no mass  Tenderness: There is no abdominal tenderness  Musculoskeletal:         General: Normal range of motion  Cervical back: Normal range of motion and neck supple  Right lower leg: No edema  Left lower leg: No edema  Lymphadenopathy:      Cervical: No cervical adenopathy  Skin:     General: Skin is warm and dry  Findings: No rash  Neurological:      Mental Status: He is alert and oriented to person, place, and time  Cranial Nerves: No cranial nerve deficit  Motor: No weakness  Coordination: Coordination normal       Gait: Gait normal       Deep Tendon Reflexes: Reflexes normal    Psychiatric:         Mood and Affect: Mood normal          Behavior: Behavior normal          Thought Content: Thought content normal          Judgment: Judgment normal            Vitals:    08/19/21 1244   BP: 120/70   BP Location: Right arm   Patient Position: Sitting   Cuff Size: Standard   Pulse: 84   SpO2: 95%   Weight: 92 5 kg (204 lb)   Height: 5' 9" (1 753 m)       Transitional Care Management Review:  Neva King is a 48 y o  male here for TCM follow up  During the TCM phone call patient stated:    TCM Call (since 7/19/2021)     Date and time call was made  8/16/2021  9:42 AM    Hospital care reviewed  Records reviewed    Patient was hospitialized at  [facility]        Date of Admission  08/13/21    Date of discharge  08/14/21    Diagnosis  alcohol withdrawal    Disposition  Home    Current Symptoms  None      TCM Call (since 7/19/2021)     Post hospital issues  None    Should patient be enrolled in anticoag monitoring? No    Scheduled for follow up?   Yes    Did you obtain your prescribed medications  Yes Do you need help managing your prescriptions or medications  No    Is transportation to your appointment needed  No    I have advised the patient to call PCP with any new or worsening symptoms  Tessa Copas, CMA    Living Arrangements  Spouse or Significiant other    Support System  Spouse    The type of support provided  Emotional; Financial; Physical    Do you have social support  Yes, as much as I need    Are you recieving any outpatient services  No    Are you recieving home care services  No    Are you using any community resources  No    Current waiver services  No    Have you fallen in the last 12 months  No    Interperter language line needed  No    Counseling  Patient          Anastacio Porter MD

## 2021-08-19 NOTE — PROGRESS NOTES
TCM Call (since 7/19/2021)     Date and time call was made  8/16/2021  9:42 AM    Hospital care reviewed  Records reviewed    Patient was hospitialized at  [facility]        Date of Admission  08/13/21    Date of discharge  08/14/21    Diagnosis  alcohol withdrawal    Disposition  Home    Current Symptoms  None      TCM Call (since 7/19/2021)     Post hospital issues  None    Should patient be enrolled in anticoag monitoring? No    Scheduled for follow up?   Yes    Did you obtain your prescribed medications  Yes    Do you need help managing your prescriptions or medications  No    Is transportation to your appointment needed  No    I have advised the patient to call PCP with any new or worsening symptoms  Dolores Aldana CMA    Living Arrangements  Spouse or Significiant other    Support System  Spouse    The type of support provided  Emotional; Financial; Physical    Do you have social support  Yes, as much as I need    Are you recieving any outpatient services  No    Are you recieving home care services  No    Are you using any community resources  No    Current waiver services  No    Have you fallen in the last 12 months  No    Interperter language line needed  No    Counseling  Patient

## 2021-09-02 ENCOUNTER — OFFICE VISIT (OUTPATIENT)
Dept: FAMILY MEDICINE CLINIC | Facility: CLINIC | Age: 51
End: 2021-09-02
Payer: COMMERCIAL

## 2021-09-02 VITALS
OXYGEN SATURATION: 93 % | HEART RATE: 77 BPM | DIASTOLIC BLOOD PRESSURE: 78 MMHG | BODY MASS INDEX: 30.96 KG/M2 | HEIGHT: 69 IN | SYSTOLIC BLOOD PRESSURE: 122 MMHG | WEIGHT: 209 LBS

## 2021-09-02 DIAGNOSIS — F33.41 RECURRENT MAJOR DEPRESSIVE DISORDER, IN PARTIAL REMISSION (HCC): ICD-10-CM

## 2021-09-02 DIAGNOSIS — G47.9 SLEEP DISORDER: Primary | ICD-10-CM

## 2021-09-02 DIAGNOSIS — F10.139 ALCOHOL ABUSE WITH WITHDRAWAL (HCC): ICD-10-CM

## 2021-09-02 PROCEDURE — 1111F DSCHRG MED/CURRENT MED MERGE: CPT | Performed by: FAMILY MEDICINE

## 2021-09-02 PROCEDURE — 1036F TOBACCO NON-USER: CPT | Performed by: FAMILY MEDICINE

## 2021-09-02 PROCEDURE — 99213 OFFICE O/P EST LOW 20 MIN: CPT | Performed by: FAMILY MEDICINE

## 2021-09-02 PROCEDURE — 3008F BODY MASS INDEX DOCD: CPT | Performed by: FAMILY MEDICINE

## 2021-09-02 RX ORDER — QUETIAPINE FUMARATE 50 MG/1
50 TABLET, FILM COATED ORAL
Qty: 30 TABLET | Refills: 5 | Status: SHIPPED | OUTPATIENT
Start: 2021-09-02 | End: 2022-01-03 | Stop reason: SDUPTHER

## 2021-09-02 NOTE — PROGRESS NOTES
Assessment/Plan:    Alcohol abuse with withdrawal ([facility] [address] )   Seemingly doing better  Is going to weight watchers and getting daily regular exercises continue to maintain total abstinence  Is no longer taking the Librium    Sleep disorder   Doing better with the switch from the Abilify to the Seroquel  Will maintain with the 50 mg dose of Seroquel at bedtime       Diagnoses and all orders for this visit:    Sleep disorder    Recurrent major depressive disorder, in partial remission (HCC)  -     QUEtiapine (SEROquel) 50 mg tablet; Take 1 tablet (50 mg total) by mouth daily at bedtime    Alcohol abuse with withdrawal (HCC)    Other orders  -     Cancel: Hepatitis C Antibody ([facility]); Future  -     Cancel: HIV 1/2 Antigen/Antibody (4th Generation) w Reflex [facility]; Future  -     Cancel: CT lung screening program; Future          Subjective:      Patient ID: Mendez Dominguez is a 48 y o  male  Patient has history of hypothyroidism, substance abuse, erectile dysfunction and depression  Has been having recurring problems with pain in the left femoral cutaneous nerve  This had responded well to amitriptyline which he is intermittently taking  Has been off the Suboxone for 6 months  Had been drinking vodka about 5 times a week  Had been placed on tapering course of Librium because of alcohol withdrawal and has finished this  Was having a lot of trouble sleeping and the Abilify was switched to Seroquel 25 mg  Is currently taking 50 mg at bedtime and is sleeping well  Has now been off the alcohol for 20 days and is feeling better      The following portions of the patient's history were reviewed and updated as appropriate: allergies, current medications, past medical history, past social history and problem list     Review of Systems   Constitutional: Positive for activity change ( improved) and appetite change ( improved)  Negative for fatigue  Eyes: Negative for visual disturbance     Respiratory: Negative for chest tightness and shortness of breath  Cardiovascular: Negative for chest pain, palpitations and leg swelling  Gastrointestinal: Negative for abdominal pain  Endocrine: Negative for polyuria  Neurological: Negative for dizziness and light-headedness  Psychiatric/Behavioral: Negative for dysphoric mood and sleep disturbance  Objective:      /78 (BP Location: Left arm, Patient Position: Sitting, Cuff Size: Standard)   Pulse 77   Ht 5' 9" (1 753 m)   Wt 94 8 kg (209 lb)   SpO2 93%   BMI 30 86 kg/m²          Physical Exam  Vitals and nursing note reviewed  Constitutional:       Appearance: Normal appearance  He is well-developed  HENT:      Head: Normocephalic  Eyes:      Conjunctiva/sclera: Conjunctivae normal    Neck:      Thyroid: No thyromegaly  Cardiovascular:      Rate and Rhythm: Normal rate and regular rhythm  Heart sounds: Normal heart sounds  No murmur ( rate is 78) heard  Pulmonary:      Effort: Pulmonary effort is normal       Breath sounds: Normal breath sounds  Abdominal:      General: Abdomen is flat  There is no distension  Palpations: Abdomen is soft  There is no mass  Tenderness: There is no abdominal tenderness  Musculoskeletal:         General: Normal range of motion  Cervical back: Normal range of motion and neck supple  Right lower leg: No edema  Left lower leg: No edema  Lymphadenopathy:      Cervical: No cervical adenopathy  Skin:     General: Skin is warm and dry  Findings: No rash  Neurological:      Mental Status: He is alert and oriented to person, place, and time  Motor: No weakness  Coordination: Coordination normal       Gait: Gait normal       Deep Tendon Reflexes: Reflexes normal    Psychiatric:         Mood and Affect: Mood normal          Behavior: Behavior normal          Thought Content:  Thought content normal          Judgment: Judgment normal

## 2021-09-02 NOTE — PATIENT INSTRUCTIONS
Overall seems to be doing well, the sleep is better  Continue to push some daily physical activity and continue the weight watchers  Will continue on all meds otherwise as is    Should get flu shot in the fall

## 2021-09-03 PROBLEM — G47.9 SLEEP DISORDER: Status: ACTIVE | Noted: 2021-09-03

## 2021-09-03 NOTE — ASSESSMENT & PLAN NOTE
Seemingly doing better  Is going to weight watchers and getting daily regular exercises continue to maintain total abstinence    Is no longer taking the Librium

## 2021-09-03 NOTE — ASSESSMENT & PLAN NOTE
Doing better with the switch from the Abilify to the Seroquel    Will maintain with the 50 mg dose of Seroquel at bedtime

## 2022-01-03 ENCOUNTER — OFFICE VISIT (OUTPATIENT)
Dept: FAMILY MEDICINE CLINIC | Facility: CLINIC | Age: 52
End: 2022-01-03
Payer: COMMERCIAL

## 2022-01-03 VITALS
HEIGHT: 69 IN | WEIGHT: 214 LBS | BODY MASS INDEX: 31.7 KG/M2 | SYSTOLIC BLOOD PRESSURE: 130 MMHG | DIASTOLIC BLOOD PRESSURE: 72 MMHG | HEART RATE: 75 BPM | OXYGEN SATURATION: 95 %

## 2022-01-03 DIAGNOSIS — G57.12 NEUROPATHY OF LEFT LATERAL FEMORAL CUTANEOUS NERVE: Chronic | ICD-10-CM

## 2022-01-03 DIAGNOSIS — Z00.00 WELLNESS EXAMINATION: Primary | ICD-10-CM

## 2022-01-03 DIAGNOSIS — F33.41 RECURRENT MAJOR DEPRESSIVE DISORDER, IN PARTIAL REMISSION (HCC): ICD-10-CM

## 2022-01-03 DIAGNOSIS — E03.9 ACQUIRED HYPOTHYROIDISM: Chronic | ICD-10-CM

## 2022-01-03 DIAGNOSIS — F10.139 ALCOHOL ABUSE WITH WITHDRAWAL (HCC): ICD-10-CM

## 2022-01-03 PROCEDURE — 3008F BODY MASS INDEX DOCD: CPT | Performed by: FAMILY MEDICINE

## 2022-01-03 PROCEDURE — 99396 PREV VISIT EST AGE 40-64: CPT | Performed by: FAMILY MEDICINE

## 2022-01-03 PROCEDURE — 3725F SCREEN DEPRESSION PERFORMED: CPT | Performed by: FAMILY MEDICINE

## 2022-01-03 PROCEDURE — 1036F TOBACCO NON-USER: CPT | Performed by: FAMILY MEDICINE

## 2022-01-03 RX ORDER — QUETIAPINE FUMARATE 50 MG/1
50 TABLET, FILM COATED ORAL
Qty: 30 TABLET | Refills: 5 | Status: SHIPPED | OUTPATIENT
Start: 2022-01-03 | End: 2022-07-18 | Stop reason: SDUPTHER

## 2022-01-03 RX ORDER — LEVOTHYROXINE SODIUM 175 UG/1
175 TABLET ORAL DAILY
Qty: 30 TABLET | Refills: 5 | Status: SHIPPED | OUTPATIENT
Start: 2022-01-03 | End: 2022-08-02 | Stop reason: ALTCHOICE

## 2022-01-03 NOTE — ASSESSMENT & PLAN NOTE
Not having any problem at this time and is not taking the amitriptyline    Will continue with this on the chart however in case problem return3

## 2022-01-03 NOTE — PROGRESS NOTES
ADULT ANNUAL PHYSICAL  [id number] [address] PRIMARY CARE    NAME: Ramírez Sena  AGE: 46 y o  SEX: male  : 1970     DATE: 1/3/2022     Assessment and Plan:     Problem List Items Addressed This Visit        Endocrine    Acquired hypothyroidism (Chronic)     Had been off the supplement for a couple months but restarted it last week  Should wait for 1 more month before doing the thyroid levels and will adjust meds accordingly         Relevant Medications    levothyroxine 175 mcg tablet    Other Relevant Orders    TSH + Free T4       Nervous and Auditory    Alcohol abuse with withdrawal ([address] )     Has been sober now for 3 months and should continue         Relevant Medications    QUEtiapine (SEROquel) 50 mg tablet    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)     Not having any problem at this time and is not taking the amitriptyline  Will continue with this on the chart however in case problem return3            Other    Recurrent major depressive disorder, in partial remission ([address] ) (Chronic)     Seems to be doing well  Although right now is usually only taking 1/2 of the Seroquel at night  Should continue current meds         Relevant Medications    QUEtiapine (SEROquel) 50 mg tablet      Other Visit Diagnoses     Wellness examination    -  Primary    Relevant Orders    Renal function panel    Lipid panel    PSA, Total Screen          Immunizations and preventive care screenings were discussed with patient today  Appropriate education was printed on patient's after visit summary  Counseling:  · Alcohol/drug use: discussed moderation in alcohol intake, the recommendations for healthy alcohol use, and avoidance of illicit drug use  BMI Counseling: Body mass index is 31 6 kg/m²  The BMI is above normal  Nutrition recommendations include moderation in carbohydrate intake   Exercise recommendations include moderate physical activity 150 minutes/week  No pharmacotherapy was ordered  Rationale for BMI follow-up plan is due to patient being overweight or obese  Is going to be going back to weight watchers program        Return in about 4 months (around 5/3/2022) for Recheck  Chief Complaint:     Chief Complaint   Patient presents with    Physical Exam     would like thyroid and chol bloodwork      History of Present Illness:     Adult Annual Physical   Patient here for a comprehensive physical exam  The patient reports no problems  Diet and Physical Activity  · Diet/Nutrition: poor diet  · Exercise: walking  Depression Screening  PHQ-2/9 Depression Screening    Little interest or pleasure in doing things: 0 - not at all  Feeling down, depressed, or hopeless: 0 - not at all  Trouble falling or staying asleep, or sleeping too much: 0 - not at all  Feeling tired or having little energy: 0 - not at all  Poor appetite or overeatin - not at all  Feeling bad about yourself - or that you are a failure or have let yourself or your family down: 0 - not at all  Trouble concentrating on things, such as reading the newspaper or watching television: 0 - not at all  Moving or speaking so slowly that other people could have noticed  Or the opposite - being so fidgety or restless that you have been moving around a lot more than usual: 0 - not at all  Thoughts that you would be better off dead, or of hurting yourself in some way: 0 - not at all  PHQ-9 Score: 0   PHQ-9 Interpretation: No or Minimal depression        General Health  · Sleep: sleeps well  · Hearing: normal - bilateral   · Vision: no vision problems  · Dental: regular dental visits   Health  · Symptoms include: none     Review of Systems:     Review of Systems   Constitutional: Negative for activity change, appetite change, chills, fatigue, fever and unexpected weight change     HENT: Negative for congestion, dental problem, hearing loss, rhinorrhea and trouble swallowing  Eyes: Negative for visual disturbance  Respiratory: Negative for apnea, cough, chest tightness and shortness of breath  Cardiovascular: Negative for chest pain, palpitations and leg swelling  Gastrointestinal: Negative for abdominal distention, abdominal pain, constipation and diarrhea  Endocrine: Negative for polyuria (Nocturia x1)  Genitourinary: Negative for difficulty urinating  Musculoskeletal: Negative for arthralgias and myalgias  Skin: Negative for rash  Allergic/Immunologic: Positive for environmental allergies  Neurological: Negative for dizziness, weakness, light-headedness, numbness and headaches  Hematological: Negative for adenopathy  Does not bruise/bleed easily  Psychiatric/Behavioral: Negative for agitation, confusion, dysphoric mood and sleep disturbance  Past Medical History:     Past Medical History:   Diagnosis Date    Depressive disorder     Hypothyroidism       Past Surgical History:     Past Surgical History:   Procedure Laterality Date    NOSE SURGERY      TONSILLECTOMY        Family History:     Family History   Problem Relation Age of Onset   Ryan Fontana Breast cancer Mother     Kidney cancer Mother     Breast cancer Family     Kidney cancer Family       Social History:     Social History     Socioeconomic History    Marital status: /Civil Union     Spouse name: None    Number of children: None    Years of education: None    Highest education level: None   Occupational History    None   Tobacco Use    Smoking status: Former Smoker     Packs/day: 1 00     Years: 25 00     Pack years: 25 00     Types: Cigarettes     Quit date: 2015     Years since quittin 9    Smokeless tobacco: Never Used   Vaping Use    Vaping Use: Never used   Substance and Sexual Activity    Alcohol use:  Yes     Alcohol/week: 5 0 standard drinks     Types: 5 Shots of liquor per week     Comment: 5 shots daily    Drug use: Never    Sexual activity: None Other Topics Concern    None   Social History Narrative    Home is not smoke-free      Social Determinants of Health     Financial Resource Strain: Not on file   Food Insecurity: Not on file   Transportation Needs: Not on file   Physical Activity: Not on file   Stress: Not on file   Social Connections: Not on file   Intimate Partner Violence: Not on file   Housing Stability: Not on file      Current Medications:     Current Outpatient Medications   Medication Sig Dispense Refill    amitriptyline (ELAVIL) 25 mg tablet TAKE 1 TABLET BY MOUTH EACH EVENING AT BEDTIME  30 tablet 5    FLUoxetine (PROzac) 40 MG capsule Take 1 capsule (40 mg total) by mouth daily 30 capsule 5    levothyroxine 175 mcg tablet Take 1 tablet (175 mcg total) by mouth daily 30 tablet 5    QUEtiapine (SEROquel) 50 mg tablet Take 1 tablet (50 mg total) by mouth daily at bedtime 30 tablet 5    fluticasone (FLONASE) 50 mcg/act nasal spray instill 2 sprays into each nostril once daily for 14 days       No current facility-administered medications for this visit  Allergies:     No Known Allergies   Physical Exam:     /72 (BP Location: Left arm, Patient Position: Sitting, Cuff Size: Standard)   Pulse 75   Ht 5' 9" (1 753 m)   Wt 97 1 kg (214 lb)   SpO2 95%   BMI 31 60 kg/m²     Physical Exam  Vitals and nursing note reviewed  Constitutional:       Appearance: Normal appearance  HENT:      Head: Normocephalic  Right Ear: Hearing, tympanic membrane, ear canal and external ear normal       Left Ear: Hearing, tympanic membrane, ear canal and external ear normal       Mouth/Throat:      Mouth: Mucous membranes are moist       Dentition: Normal dentition  Eyes:      Extraocular Movements: Extraocular movements intact  Conjunctiva/sclera: Conjunctivae normal       Pupils: Pupils are equal, round, and reactive to light  Neck:      Vascular: No carotid bruit     Cardiovascular:      Rate and Rhythm: Normal rate and regular rhythm  Pulses:           Dorsalis pedis pulses are 1+ on the right side and 1+ on the left side  Posterior tibial pulses are 1+ on the right side and 1+ on the left side  Heart sounds: Normal heart sounds  No murmur (Rate is 66) heard  Pulmonary:      Effort: Pulmonary effort is normal       Breath sounds: Normal breath sounds  Abdominal:      General: Abdomen is flat  There is no distension  Palpations: Abdomen is soft  There is no mass  Tenderness: There is no abdominal tenderness  Hernia: There is no hernia in the left inguinal area or right inguinal area  Genitourinary:     Penis: Normal and circumcised  Testes: Normal    Musculoskeletal:         General: No swelling  Cervical back: Normal range of motion and neck supple  No muscular tenderness  Right lower leg: No edema  Left lower leg: No edema  Right foot: No deformity  Left foot: No deformity  Feet:      Right foot:      Protective Sensation: 8 sites tested  8 sites sensed  Skin integrity: Skin integrity normal       Left foot:      Protective Sensation: 8 sites tested  8 sites sensed  Skin integrity: Skin integrity normal    Lymphadenopathy:      Cervical: No cervical adenopathy  Skin:     General: Skin is warm and dry  Capillary Refill: Capillary refill takes 2 to 3 seconds  Findings: No rash  Neurological:      General: No focal deficit present  Mental Status: He is alert and oriented to person, place, and time  Cranial Nerves: No cranial nerve deficit  Sensory: No sensory deficit  Motor: No weakness  Coordination: Coordination normal       Gait: Gait normal       Deep Tendon Reflexes: Reflexes normal    Psychiatric:         Mood and Affect: Mood normal          Behavior: Behavior normal          Thought Content:  Thought content normal          Judgment: Judgment normal           Lee Lopez MD  [address] [place] [address] [facility] PRIMARY CARE

## 2022-01-03 NOTE — ASSESSMENT & PLAN NOTE
Seems to be doing well  Although right now is usually only taking 1/2 of the Seroquel at night    Should continue current meds

## 2022-01-03 NOTE — PATIENT INSTRUCTIONS
Overall exam today looks good  Is going to go back on the weight watchers diet  Will check renal panel lipid panel and PSA for wellness check and recheck thyroid levels  Is going to wait 1 month to do this because had been off of the thyroid supple    Has had the COVID series as well as booster and flu shot

## 2022-01-03 NOTE — ASSESSMENT & PLAN NOTE
Had been off the supplement for a couple months but restarted it last week    Should wait for 1 more month before doing the thyroid levels and will adjust meds accordingly

## 2022-03-18 ENCOUNTER — OFFICE VISIT (OUTPATIENT)
Dept: FAMILY MEDICINE CLINIC | Facility: CLINIC | Age: 52
End: 2022-03-18
Payer: COMMERCIAL

## 2022-03-18 VITALS
HEART RATE: 85 BPM | WEIGHT: 208 LBS | DIASTOLIC BLOOD PRESSURE: 76 MMHG | OXYGEN SATURATION: 95 % | HEIGHT: 69 IN | BODY MASS INDEX: 30.81 KG/M2 | SYSTOLIC BLOOD PRESSURE: 120 MMHG

## 2022-03-18 DIAGNOSIS — R51.9 BILATERAL HEADACHES: Primary | ICD-10-CM

## 2022-03-18 PROCEDURE — 3008F BODY MASS INDEX DOCD: CPT | Performed by: FAMILY MEDICINE

## 2022-03-18 PROCEDURE — 1036F TOBACCO NON-USER: CPT | Performed by: FAMILY MEDICINE

## 2022-03-18 PROCEDURE — 99213 OFFICE O/P EST LOW 20 MIN: CPT | Performed by: FAMILY MEDICINE

## 2022-03-18 NOTE — PATIENT INSTRUCTIONS
Discussed problem with the headaches  Will check sed rate and C reactive protein to screen for temporal arteritis  If this is normal then I feel should have eye exam and will probably  Needs glasses to assist with the work    May continue to use Tylenol or Advil to help with the headache

## 2022-03-18 NOTE — ASSESSMENT & PLAN NOTE
Discussed problem  I feel this probably relates to eye strain but will do sed rate and C reactive protein to screen for possible temporal arteritis     If this is negative will set up for eye exam   Should try to center head in front of the area that he is trying to read which may help

## 2022-03-18 NOTE — PROGRESS NOTES
Assessment/Plan:    Bilateral headaches  Discussed problem  I feel this probably relates to eye strain but will do sed rate and C reactive protein to screen for possible temporal arteritis   If this is negative will set up for eye exam   Should try to center head in front of the area that he is trying to read which may help       Diagnoses and all orders for this visit:    Bilateral headaches  -     Sedimentation rate, automated; Future  -     C-reactive protein; Future          Subjective:      Patient ID: Saniya Vora is a 46 y o  male  Patient has history of hypothyroidism, substance abuse, erectile dysfunction and depression  Seen today for problems with headaches over the last several weeks which seem to hurt on the sides of the head on the front around the temporal areas  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 and he feels this could relate to it  Does not notice any blurred vision  Has occasional morning headaches but the headaches usually occur during the day  Denies cough or congestion  Tylenol helps but does not take the headache away      The following portions of the patient's history were reviewed and updated as appropriate: allergies, current medications, past medical history, past social history and problem list     Review of Systems   HENT: Negative for congestion  Eyes: Negative for photophobia and visual disturbance  Respiratory: Negative for cough  Musculoskeletal: Negative for neck pain  Neurological: Positive for headaches  Negative for dizziness and light-headedness  Objective:      /76 (BP Location: Right arm, Patient Position: Sitting, Cuff Size: Standard)   Pulse 85   Ht 5' 9" (1 753 m)   Wt 94 3 kg (208 lb)   SpO2 95%   BMI 30 72 kg/m²          Physical Exam  Vitals and nursing note reviewed  Constitutional:       Appearance: Normal appearance  He is well-developed  HENT:      Head: Normocephalic  Right Ear: Tympanic membrane, ear canal and external ear normal       Left Ear: Tympanic membrane, ear canal and external ear normal       Nose: Nose normal       Mouth/Throat:      Mouth: Mucous membranes are moist    Eyes:      Extraocular Movements: Extraocular movements intact  Conjunctiva/sclera: Conjunctivae normal       Pupils: Pupils are equal, round, and reactive to light  Neck:      Thyroid: No thyromegaly  Cardiovascular:      Rate and Rhythm: Normal rate and regular rhythm  Heart sounds: Normal heart sounds  No murmur heard  Pulmonary:      Effort: Pulmonary effort is normal       Breath sounds: Normal breath sounds  Abdominal:      General: There is no distension  Palpations: There is no mass  Tenderness: There is no abdominal tenderness  Musculoskeletal:         General: Normal range of motion  Cervical back: Normal range of motion and neck supple  No tenderness  Right lower leg: No edema  Left lower leg: No edema  Lymphadenopathy:      Cervical: No cervical adenopathy  Skin:     General: Skin is warm and dry  Findings: No rash  Neurological:      Mental Status: He is alert and oriented to person, place, and time  Motor: No weakness        Coordination: Coordination normal       Gait: Gait normal       Deep Tendon Reflexes: Reflexes normal    Psychiatric:         Mood and Affect: Mood normal

## 2022-05-02 ENCOUNTER — RA CDI HCC (OUTPATIENT)
Dept: OTHER | Facility: HOSPITAL | Age: 52
End: 2022-05-02

## 2022-05-02 NOTE — PROGRESS NOTES
[facility]  coding opportunities          Chart Reviewed number of suggestions sent to Provider: 1   With the beginning of the new year, this is a reminder to address ALL [facility] [address]  (risk adjustment) codes for 2022 as patient scores reset to zero MARU     F19 11 History of substance abuse ([facility] [address] )  Patients Insurance        Commercial Insurance: [address] [place] [address]

## 2022-05-06 ENCOUNTER — TELEPHONE (OUTPATIENT)
Dept: FAMILY MEDICINE CLINIC | Facility: CLINIC | Age: 52
End: 2022-05-06

## 2022-07-18 ENCOUNTER — OFFICE VISIT (OUTPATIENT)
Dept: FAMILY MEDICINE CLINIC | Facility: CLINIC | Age: 52
End: 2022-07-18
Payer: COMMERCIAL

## 2022-07-18 VITALS
DIASTOLIC BLOOD PRESSURE: 74 MMHG | HEIGHT: 69 IN | BODY MASS INDEX: 30.66 KG/M2 | WEIGHT: 207 LBS | SYSTOLIC BLOOD PRESSURE: 120 MMHG | HEART RATE: 96 BPM | OXYGEN SATURATION: 98 %

## 2022-07-18 DIAGNOSIS — F33.41 RECURRENT MAJOR DEPRESSIVE DISORDER, IN PARTIAL REMISSION (HCC): ICD-10-CM

## 2022-07-18 DIAGNOSIS — R51.9 BILATERAL HEADACHES: Primary | ICD-10-CM

## 2022-07-18 DIAGNOSIS — J30.89 NON-SEASONAL ALLERGIC RHINITIS, UNSPECIFIED TRIGGER: ICD-10-CM

## 2022-07-18 DIAGNOSIS — E03.9 ACQUIRED HYPOTHYROIDISM: ICD-10-CM

## 2022-07-18 DIAGNOSIS — Z87.891 PERSONAL HISTORY OF NICOTINE DEPENDENCE: ICD-10-CM

## 2022-07-18 DIAGNOSIS — Z23 ENCOUNTER FOR IMMUNIZATION: ICD-10-CM

## 2022-07-18 DIAGNOSIS — Z11.59 NEED FOR HEPATITIS C SCREENING TEST: ICD-10-CM

## 2022-07-18 DIAGNOSIS — Z12.2 ENCOUNTER FOR SCREENING FOR LUNG CANCER: ICD-10-CM

## 2022-07-18 PROCEDURE — 99213 OFFICE O/P EST LOW 20 MIN: CPT | Performed by: FAMILY MEDICINE

## 2022-07-18 PROCEDURE — 1036F TOBACCO NON-USER: CPT | Performed by: FAMILY MEDICINE

## 2022-07-18 PROCEDURE — 3008F BODY MASS INDEX DOCD: CPT | Performed by: FAMILY MEDICINE

## 2022-07-18 RX ORDER — SUMATRIPTAN 100 MG/1
100 TABLET, FILM COATED ORAL ONCE AS NEEDED
Qty: 9 TABLET | Refills: 1 | Status: SHIPPED | OUTPATIENT
Start: 2022-07-18

## 2022-07-18 RX ORDER — QUETIAPINE FUMARATE 50 MG/1
50 TABLET, FILM COATED ORAL
Qty: 30 TABLET | Refills: 5 | Status: SHIPPED | OUTPATIENT
Start: 2022-07-18

## 2022-07-18 NOTE — ASSESSMENT & PLAN NOTE
These may be migraines and will place on trial of Imitrex 100 mg    If not effective after 2 episodes should stop

## 2022-07-18 NOTE — PATIENT INSTRUCTIONS
Overall seems to be doing well  Continues to have trouble with the headaches which should not respond well to over-the-counter medication  Will try using Imitrex 100 mg to be taken at the beginning of headache if it is a severe headache  Should give this a trial of 2 times and if not effective should stop  Continue other meds as is  Try to get some daily walking activity and try to watch starch in the diet which will help with the weight    Will also recheck thyroid levels for history of hypothyroidism

## 2022-07-18 NOTE — PROGRESS NOTES
Assessment/Plan:    Bilateral headaches  These may be migraines and will place on trial of Imitrex 100 mg  If not effective after 2 episodes should stop    Acquired hypothyroidism  Will recheck thyroid levels and adjust meds accordingly       Diagnoses and all orders for this visit:    Bilateral headaches  -     SUMAtriptan (Imitrex) 100 mg tablet; Take 1 tablet (100 mg total) by mouth once as needed for migraine for up to 1 dose    Need for hepatitis C screening test    Encounter for immunization    Encounter for screening for lung cancer    Personal history of nicotine dependence    Recurrent major depressive disorder, in partial remission (HCC)  -     QUEtiapine (SEROquel) 50 mg tablet; Take 1 tablet (50 mg total) by mouth daily at bedtime    Acquired hypothyroidism  -     TSH + Free T4; Future    Non-seasonal allergic rhinitis, unspecified trigger          Subjective:      Patient ID: Kannan Knight is a 46 y o  male  Patient has history of hypothyroidism, substance abuse, erectile dysfunction and depression  Has been having continue problems with headaches which are relatively severe that do not respond well to over-the-counter medications  Has had 3 in the past week  Would like to have his thyroid levels rechecked      The following portions of the patient's history were reviewed and updated as appropriate: allergies, current medications, past medical history, past social history and problem list     Review of Systems   Constitutional: Negative for fever  HENT: Positive for congestion (Intermittent trouble with allergies, currently taking Allegra and nasal steroid once a day)  Eyes: Negative for visual disturbance  Respiratory: Negative for chest tightness and shortness of breath  Cardiovascular: Negative for chest pain, palpitations and leg swelling  Gastrointestinal: Negative for abdominal pain and nausea  Endocrine: Negative for polyuria     Musculoskeletal: Negative for neck pain    Allergic/Immunologic: Positive for environmental allergies  Neurological: Positive for headaches  Negative for dizziness and light-headedness  Hematological: Negative for adenopathy  Psychiatric/Behavioral: Negative for sleep disturbance  The patient is not nervous/anxious  Objective:      /74 (BP Location: Right arm, Patient Position: Sitting, Cuff Size: Standard)   Pulse 96   Ht 5' 9" (1 753 m)   Wt 93 9 kg (207 lb)   SpO2 98%   BMI 30 57 kg/m²          Physical Exam  Vitals and nursing note reviewed  Constitutional:       Appearance: Normal appearance  He is well-developed  HENT:      Head: Normocephalic  Comments: No tenderness to palpation in the temporal areas     Nose: Nose normal       Mouth/Throat:      Mouth: Mucous membranes are moist    Eyes:      Conjunctiva/sclera: Conjunctivae normal    Neck:      Thyroid: No thyromegaly  Vascular: No carotid bruit  Cardiovascular:      Rate and Rhythm: Normal rate and regular rhythm  Heart sounds: Normal heart sounds  No murmur (Rate is 72) heard  Pulmonary:      Effort: Pulmonary effort is normal       Breath sounds: Normal breath sounds  Abdominal:      General: There is no distension  Palpations: There is no mass  Tenderness: There is no abdominal tenderness  Musculoskeletal:         General: Normal range of motion  Cervical back: Normal range of motion and neck supple  No tenderness  Right lower leg: No edema  Left lower leg: No edema  Lymphadenopathy:      Cervical: No cervical adenopathy  Skin:     General: Skin is warm and dry  Findings: No rash  Neurological:      Mental Status: He is alert and oriented to person, place, and time  Motor: No weakness        Coordination: Coordination normal       Gait: Gait normal       Deep Tendon Reflexes: Reflexes normal    Psychiatric:         Mood and Affect: Mood normal

## 2022-08-02 DIAGNOSIS — E03.9 ACQUIRED HYPOTHYROIDISM: Primary | ICD-10-CM

## 2022-08-02 RX ORDER — LEVOTHYROXINE SODIUM 0.12 MG/1
125 TABLET ORAL
Qty: 30 TABLET | Refills: 5 | Status: SHIPPED | OUTPATIENT
Start: 2022-08-02

## 2022-08-04 DIAGNOSIS — E05.90 HYPERTHYROIDISM DETERMINED BY THYROID FUNCTION TEST: Primary | ICD-10-CM

## 2022-08-04 DIAGNOSIS — R94.6 HYPERTHYROIDISM DETERMINED BY THYROID FUNCTION TEST: Primary | ICD-10-CM

## 2022-11-14 DIAGNOSIS — R51.9 BILATERAL HEADACHES: ICD-10-CM

## 2022-11-14 RX ORDER — SUMATRIPTAN 100 MG/1
100 TABLET, FILM COATED ORAL ONCE AS NEEDED
Qty: 9 TABLET | Refills: 1 | Status: SHIPPED | OUTPATIENT
Start: 2022-11-14

## 2023-01-09 ENCOUNTER — OFFICE VISIT (OUTPATIENT)
Dept: FAMILY MEDICINE CLINIC | Facility: CLINIC | Age: 53
End: 2023-01-09

## 2023-01-09 VITALS
WEIGHT: 212 LBS | BODY MASS INDEX: 31.4 KG/M2 | SYSTOLIC BLOOD PRESSURE: 124 MMHG | HEART RATE: 75 BPM | HEIGHT: 69 IN | DIASTOLIC BLOOD PRESSURE: 74 MMHG | OXYGEN SATURATION: 96 %

## 2023-01-09 DIAGNOSIS — G57.12 NEUROPATHY OF LEFT LATERAL FEMORAL CUTANEOUS NERVE: Chronic | ICD-10-CM

## 2023-01-09 DIAGNOSIS — F17.211 NICOTINE DEPENDENCE, CIGARETTES, IN REMISSION: ICD-10-CM

## 2023-01-09 DIAGNOSIS — Z00.00 WELLNESS EXAMINATION: Primary | ICD-10-CM

## 2023-01-09 DIAGNOSIS — R51.9 BILATERAL HEADACHES: Chronic | ICD-10-CM

## 2023-01-09 DIAGNOSIS — F33.41 RECURRENT MAJOR DEPRESSIVE DISORDER, IN PARTIAL REMISSION (HCC): Chronic | ICD-10-CM

## 2023-01-09 DIAGNOSIS — E03.9 ACQUIRED HYPOTHYROIDISM: Chronic | ICD-10-CM

## 2023-01-09 DIAGNOSIS — Z12.2 ENCOUNTER FOR SCREENING FOR LUNG CANCER: ICD-10-CM

## 2023-01-09 DIAGNOSIS — J30.89 NON-SEASONAL ALLERGIC RHINITIS, UNSPECIFIED TRIGGER: ICD-10-CM

## 2023-01-09 RX ORDER — FLUOXETINE HYDROCHLORIDE 40 MG/1
40 CAPSULE ORAL DAILY
Qty: 30 CAPSULE | Refills: 5 | Status: SHIPPED | OUTPATIENT
Start: 2023-01-09

## 2023-01-09 NOTE — PROGRESS NOTES
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)     Doing well at this time and is not taking the amitriptyline  If reoccurs may restart and will leave on medication list at this time            Other    Bilateral headaches (Chronic)     Still can get this with weather changes and this does respond to Imitrex         Recurrent major depressive disorder, in partial remission (HCC) (Chronic)     Overall doing well, continue current regimen         Relevant Medications    FLUoxetine (PROzac) 40 MG capsule   Other Visit Diagnoses     Wellness examination    -  Primary    Encounter for screening for lung cancer        Relevant Orders    CT lung screening program    Nicotine dependence, cigarettes, in remission        Relevant Orders    CT lung screening program          Immunizations and preventive care screenings were discussed with patient today  Appropriate education was printed on patient's after visit summary  Discussed risks and benefits of prostate cancer screening  We discussed the controversial history of PSA screening for prostate cancer in the [place] as well as the risk of over detection and over treatment of prostate cancer by way of PSA screening    The patient understands that PSA blood testing is an imperfect way to screen for prostate cancer and that elevated PSA levels in the blood may also be caused by infection, inflammation, prostatic trauma or manipulation, urological procedures, or by benign prostatic enlargement  The role of the digital rectal examination in prostate cancer screening was also discussed and I discussed with him that there is large interobserver variability in the findings of digital rectal examination  Counseling:  Exercise: the importance of regular exercise/physical activity was discussed  Recommend exercise 3-5 times per week for at least 30 minutes  · Watch starch in diet    BMI Counseling: Body mass index is 31 31 kg/m²  The BMI is above normal  Nutrition recommendations include moderation in carbohydrate intake  Exercise recommendations include moderate physical activity 150 minutes/week  No pharmacotherapy was ordered  Rationale for BMI follow-up plan is due to patient being overweight or obese  Return in about 6 months (around 2023) for Recheck  Chief Complaint:     Chief Complaint   Patient presents with   • Physical Exam      History of Present Illness:     Adult Annual Physical   Patient here for a comprehensive physical exam  The patient reports no problems  Diet and Physical Activity  · Diet/Nutrition: poor diet  · Exercise: no formal exercise  Depression Screening  PHQ-2/9 Depression Screening    Little interest or pleasure in doing things: 0 - not at all  Feeling down, depressed, or hopeless: 0 - not at all  Trouble falling or staying asleep, or sleeping too much: 0 - not at all  Feeling tired or having little energy: 0 - not at all  Poor appetite or overeatin - not at all  Feeling bad about yourself - or that you are a failure or have let yourself or your family down: 0 - not at all  Trouble concentrating on things, such as reading the newspaper or watching television: 0 - not at all  Moving or speaking so slowly that other people could have noticed   Or the opposite - being so fidgety or restless that you have been moving around a lot more than usual: 0 - not at all  Thoughts that you would be better off dead, or of hurting yourself in some way: 0 - not at all  PHQ-9 Score: 0   PHQ-9 Interpretation: No or Minimal depression        General Health  · Sleep: sleeps well  · Hearing: normal - bilateral   · Vision: no vision problems  · Dental: regular dental visits   Health  · Symptoms include: erectile dysfunction     Review of Systems:     Review of Systems   Constitutional: Negative for activity change, appetite change, chills, fatigue, fever and unexpected weight change  HENT: Negative for congestion, dental problem, hearing loss, rhinorrhea and trouble swallowing  Eyes: Negative for visual disturbance  Respiratory: Negative for apnea, cough, chest tightness and shortness of breath  Cardiovascular: Negative for chest pain, palpitations and leg swelling  Gastrointestinal: Negative for abdominal distention, abdominal pain, constipation and diarrhea  Endocrine: Negative for polyuria (Nocturia x1)  Genitourinary: Negative for difficulty urinating  Musculoskeletal: Negative for arthralgias and myalgias  Skin: Negative for rash  Allergic/Immunologic: Positive for environmental allergies  Neurological: Positive for headaches (Sometimes with weather changes)  Negative for dizziness, weakness, light-headedness and numbness  Hematological: Negative for adenopathy  Psychiatric/Behavioral: Negative for agitation and sleep disturbance        Past Medical History:     Past Medical History:   Diagnosis Date   • Depressive disorder    • Hypothyroidism       Past Surgical History:     Past Surgical History:   Procedure Laterality Date   • NOSE SURGERY     • TONSILLECTOMY        Family History:     Family History   Problem Relation Age of Onset   • Breast cancer Mother    • Kidney cancer Mother    • Breast cancer Family    • Kidney cancer Family       Social History:     Social History     Socioeconomic History   • Marital status: /Civil Union     Spouse name: None   • Number of children: None   • Years of education: None   • Highest education level: None   Occupational History   • None   Tobacco Use   • Smoking status: Former     Packs/day: 1 00     Years: 25 00     Pack years: 25 00     Types: Cigarettes     Quit date: 2015     Years since quittin 9   • Smokeless tobacco: Never   Vaping Use   • Vaping Use: Never used   Substance and Sexual Activity   • Alcohol use: Yes     Alcohol/week: 5 0 standard drinks     Types: 5 Shots of liquor per week     Comment: 5 shots daily   • Drug use: Never   • Sexual activity: None   Other Topics Concern   • None   Social History Narrative    Home is not smoke-free      Social Determinants of Health     Financial Resource Strain: Not on file   Food Insecurity: Not on file   Transportation Needs: Not on file   Physical Activity: Not on file   Stress: Not on file   Social Connections: Not on file   Intimate Partner Violence: Not on file   Housing Stability: Not on file      Current Medications:     Current Outpatient Medications   Medication Sig Dispense Refill   • amitriptyline (ELAVIL) 25 mg tablet TAKE 1 TABLET BY MOUTH EACH EVENING AT BEDTIME  30 tablet 5   • FLUoxetine (PROzac) 40 MG capsule Take 1 capsule (40 mg total) by mouth daily 30 capsule 5   • fluticasone (FLONASE) 50 mcg/act nasal spray instill 2 sprays into each nostril once daily for 14 days     • levothyroxine (Euthyrox) 125 mcg tablet Take 1 tablet (125 mcg total) by mouth daily in the early morning 30 tablet 5   • QUEtiapine (SEROquel) 50 mg tablet Take 1 tablet (50 mg total) by mouth daily at bedtime 30 tablet 5   • SUMAtriptan (Imitrex) 100 mg tablet Take 1 tablet (100 mg total) by mouth once as needed for migraine for up to 1 dose 9 tablet 1     No current facility-administered medications for this visit        Allergies:     No Known Allergies   Physical Exam:     /74 (BP Location: Left arm, Patient Position: Sitting, Cuff Size: Standard)   Pulse 75   Ht 5' 9" (1 753 m)   Wt 96 2 kg (212 lb) SpO2 96%   BMI 31 31 kg/m²     Physical Exam  Vitals and nursing note reviewed  Constitutional:       Appearance: Normal appearance  HENT:      Head: Normocephalic  Right Ear: Hearing, tympanic membrane, ear canal and external ear normal       Left Ear: Hearing, tympanic membrane, ear canal and external ear normal       Nose: Nose normal       Mouth/Throat:      Mouth: Mucous membranes are moist       Dentition: Normal dentition  Eyes:      Extraocular Movements: Extraocular movements intact  Conjunctiva/sclera: Conjunctivae normal       Pupils: Pupils are equal, round, and reactive to light  Neck:      Vascular: No carotid bruit  Cardiovascular:      Rate and Rhythm: Normal rate and regular rhythm  Pulses:           Dorsalis pedis pulses are 2+ on the right side and 1+ on the left side  Posterior tibial pulses are 1+ on the right side and 1+ on the left side  Heart sounds: Normal heart sounds  No murmur heard  Pulmonary:      Effort: Pulmonary effort is normal       Breath sounds: Normal breath sounds  Abdominal:      General: Abdomen is flat  There is no distension  Palpations: Abdomen is soft  There is no mass  Tenderness: There is no abdominal tenderness  Hernia: There is no hernia in the left inguinal area or right inguinal area  Genitourinary:     Penis: Normal and circumcised  Testes: Normal    Musculoskeletal:         General: No swelling  Cervical back: Normal range of motion and neck supple  No muscular tenderness  Right lower leg: No edema  Left lower leg: No edema  Right foot: No deformity  Left foot: No deformity  Feet:      Right foot:      Protective Sensation: 8 sites tested  8 sites sensed  Skin integrity: Skin integrity normal       Left foot:      Protective Sensation: 8 sites tested  8 sites sensed  Skin integrity: Skin integrity normal    Lymphadenopathy:      Cervical: No cervical adenopathy  Skin:     General: Skin is warm and dry  Capillary Refill: Capillary refill takes 2 to 3 seconds  Findings: No rash  Neurological:      General: No focal deficit present  Mental Status: He is alert and oriented to person, place, and time  Cranial Nerves: No cranial nerve deficit  Sensory: No sensory deficit  Motor: No weakness  Coordination: Coordination normal       Gait: Gait normal       Deep Tendon Reflexes: Reflexes abnormal    Psychiatric:         Mood and Affect: Mood normal          Behavior: Behavior normal          Thought Content:  Thought content normal          Judgment: Judgment normal           Kaitlynn Aviles MD  [address] [place] [address] PRIMARY CARE

## 2023-01-09 NOTE — ASSESSMENT & PLAN NOTE
Doing well at this time and is not taking the amitriptyline    If reoccurs may restart and will leave on medication list at this time

## 2023-01-09 NOTE — PATIENT INSTRUCTIONS
Overall exam today looks good  Did recently have Nella but seems to recovered well  Try to get some daily walking activity and and watch the starch in the diet  Has had flu shot    We will continue current meds as is

## 2023-01-30 ENCOUNTER — HOSPITAL ENCOUNTER (OUTPATIENT)
Dept: CT IMAGING | Facility: HOSPITAL | Age: 53
Discharge: HOME | End: 2023-01-30
Attending: FAMILY MEDICINE

## 2023-01-30 DIAGNOSIS — Z12.2 ENCOUNTER FOR SCREENING FOR LUNG CANCER: ICD-10-CM

## 2023-01-30 DIAGNOSIS — F17.211 NICOTINE DEPENDENCE, CIGARETTES, IN REMISSION: ICD-10-CM

## 2023-02-06 DIAGNOSIS — I71.20 THORACIC AORTIC ANEURYSM WITHOUT RUPTURE, UNSPECIFIED PART: Primary | ICD-10-CM

## 2023-02-16 ENCOUNTER — TELEPHONE (OUTPATIENT)
Dept: FAMILY MEDICINE CLINIC | Facility: CLINIC | Age: 53
End: 2023-02-16

## 2023-02-16 NOTE — TELEPHONE ENCOUNTER
Patient called in regards to results of the lung CT he had done on 1/30, He would like clarification on the results   Please advise

## 2023-02-27 ENCOUNTER — OFFICE VISIT (OUTPATIENT)
Dept: CARDIAC SURGERY | Facility: CLINIC | Age: 53
End: 2023-02-27

## 2023-02-27 VITALS
OXYGEN SATURATION: 100 % | WEIGHT: 211.7 LBS | BODY MASS INDEX: 31.36 KG/M2 | DIASTOLIC BLOOD PRESSURE: 86 MMHG | HEART RATE: 66 BPM | HEIGHT: 69 IN | SYSTOLIC BLOOD PRESSURE: 134 MMHG

## 2023-02-27 DIAGNOSIS — E66.9 OBESITY (BMI 30.0-34.9): Primary | ICD-10-CM

## 2023-02-27 DIAGNOSIS — Z87.898 HISTORY OF ALCOHOL USE: ICD-10-CM

## 2023-02-27 DIAGNOSIS — I71.20 THORACIC AORTIC ANEURYSM WITHOUT RUPTURE, UNSPECIFIED PART: ICD-10-CM

## 2023-02-27 DIAGNOSIS — I71.20 THORACIC AORTIC ANEURYSM WITHOUT RUPTURE, UNSPECIFIED PART: Primary | ICD-10-CM

## 2023-02-27 DIAGNOSIS — Z87.891 FORMER TOBACCO USE: ICD-10-CM

## 2023-02-27 NOTE — PROGRESS NOTES
Consultation - Cardiothoracic Surgery   Fred Neff 46 y o  male MRN: [id number]    Physician Requesting Consult: Dr Diane Chaney    Reason for Consult / Principal Problem: Aortic aneurysm    History of Present Illness: Fred Neff is a 46y o  year old male presents for evaluation of ascending aortic aneurysm  Patient has a CT scan for lung screening in 1/2023  CT scan reveal ascending aortic aneurysm therefore is sent to office for surgical evaluation  Upon examination today, Mr Boogie Foley reports he is feeling well  Admits to anxiety regarding diagnosis  Denies CP, palpitations, SOB, GAO, LE edema b/l, orthopnea, PND, numbness/tinlging/paresthesias in UE or LE bilaterally, HA, lightheadeness, dizziness, presyncopal symptoms, hx syncopal events, N/V/D, hemoptysis, hematemesis, hematochezia, melena  Denies hx stroke, hx cancer w/ chest wall radiation, hx blood loss anemia, hx varicose veins or vein stripping  PMH includes TAA (48mm, no TTE in system), obesity (BMI 31 31), hypothyroidism,h/o ETOH abuse (2 years of abuse, no liver dysfunction), RLS, h/o herniated lumbar DDD, h/o substance abuse (opiate use, no IVDA, no HIV/hepatitis, prior suboxone use), h/o COVID, depression, ED, neuropathy, former tobacco use, migraines, allergic rhinitis  PSH includes tonsillectomy, nasal sx (polypectomy, deviated septum sx), dental extractions under anesthesia  Denies FH of CAD/MI, HTN, HL, valvular disease, DM, aortic aneurysms, or SCD  States father is estranged & does not know much of this history  Patient is working for a [employer], uses [employer] rather than heavy lifting  Lives in a multilevel home w/ wife  Does not use an assist device for ambulation  Does not do exercise at the current time or heavy lifting from day to day basis  Drives  (+) former tobacco use (1ppd x25 years, quit 2015)  (+) ETOH use (5 shots liquor/day, no liver dysfunction to patient's knowledge)   Denies current or prior use of drugs  NKDA  Patient does not take cardiac medications  Other medications can be reviewed in the patient chart  Patient sees Dr Sulaiman Rodriguez as his primary care physician, their prior visit documentation was reviewed at this visit  Patient does not have a cardiologist  Patient does see a dentist regularly with last appointment w/i 1 year  (-) dentures  Dr Ezra Leroy in [place]  Past Medical History:  Past Medical History:   Diagnosis Date   • Allergic rhinitis    • Depression    • Erectile dysfunction    • Former tobacco use    • History of alcohol use     2 years of abuse, no liver dysfunction   • History of COVID-19    • History of herniated intervertebral disc     lumbar,affecting left side   • History of substance abuse ([facility] [address] )     opiate use, no IVDA, no HIV/hepatitis, prior suboxone use   • Hypothyroidism    • Hypothyroidism    • Migraines    • Neuropathy    • Obesity (BMI 30 0-34  9)    • RLS (restless legs syndrome)    • Thoracic aortic aneurysm (TAA)     48mm     Past Surgical History:   Past Surgical History:   Procedure Laterality Date   • DENTAL SURGERY      extraction requiring anesthesia   • NASAL POLYP EXCISION     • NASAL SEPTUM SURGERY     • TONSILLECTOMY       Family History:  Family History   Problem Relation Age of Onset   • Breast cancer Mother    • Kidney cancer Mother    • Breast cancer Family    • Kidney cancer Family      Social History:  Social History     Substance and Sexual Activity   Alcohol Use Yes   • Alcohol/week: 5 0 standard drinks   • Types: 5 Shots of liquor per week    Comment: 5 shots daily     Social History     Substance and Sexual Activity   Drug Use Never     Social History     Tobacco Use   Smoking Status Former   • Packs/day: 1 00   • Years: 25 00   • Pack years: 25 00   • Types: Cigarettes   • Quit date: 2015   • Years since quittin 0   Smokeless Tobacco Never       Home Medications:   Prior to Admission medications    Medication Sig Start Date End Date Taking? Authorizing Provider   amitriptyline (ELAVIL) 25 mg tablet TAKE 1 TABLET BY MOUTH EACH EVENING AT BEDTIME  1/6/21   Yuriy Cadena MD   FLUoxetine (PROzac) 40 MG capsule Take 1 capsule (40 mg total) by mouth daily 1/9/23   Yuriy Cadena MD   fluticasone [facility]) 50 mcg/act nasal spray instill 2 sprays into each nostril once daily for 14 days 8/12/21 1/9/23  Historical Provider, MD   levothyroxine (Euthyrox) 125 mcg tablet Take 1 tablet (125 mcg total) by mouth daily in the early morning 8/2/22   Yuriy Cadena MD   QUEtiapine (SEROquel) 50 mg tablet Take 1 tablet (50 mg total) by mouth daily at bedtime 7/18/22   Yuriy Cadena MD   SUMAtriptan (Imitrex) 100 mg tablet Take 1 tablet (100 mg total) by mouth once as needed for migraine for up to 1 dose 11/14/22   Yuriy Cadena MD       Allergies:  No Known Allergies    Review of Systems:     Review of Systems   Constitutional: Negative  Negative for activity change, diaphoresis, fatigue and unexpected weight change  HENT: Negative  Negative for dental problem  Respiratory: Negative  Negative for chest tightness, shortness of breath and wheezing  Cardiovascular: Negative  Negative for chest pain, palpitations and leg swelling  Gastrointestinal: Negative  Negative for blood in stool, constipation, diarrhea, nausea and vomiting  Genitourinary: Negative  Musculoskeletal: Negative  Negative for arthralgias, back pain, gait problem and myalgias  Skin: Negative  Neurological: Negative  Negative for dizziness, syncope, weakness, light-headedness, numbness and headaches  Hematological: Negative  Does not bruise/bleed easily  All other systems reviewed and are negative        Vital Signs:     Vitals:    02/27/23 1419   BP: 134/86   BP Location: Left arm   Patient Position: Sitting   Cuff Size: Standard   Pulse: 66   SpO2: 100%   Weight: 96 kg (211 lb 11 2 oz)   Height: 5' 9" (1 753 m) Physical Exam:     Physical Exam  Constitutional:       General: He is not in acute distress  Appearance: Normal appearance  He is well-developed  He is not ill-appearing or toxic-appearing  Comments: Sitting in chair in NAD   HENT:      Head: Normocephalic and atraumatic  Mouth/Throat:      Dentition: Normal dentition  Does not have dentures  Pharynx: Uvula midline  Neck:      Vascular: No carotid bruit or JVD  Trachea: Trachea normal    Cardiovascular:      Rate and Rhythm: Normal rate and regular rhythm  Chest Wall: PMI is not displaced  Heart sounds: S1 normal and S2 normal  No murmur heard  No friction rub  No S3 or S4 sounds  Comments: No heaves/lifts on palpation  Pulmonary:      Effort: Pulmonary effort is normal  No accessory muscle usage or respiratory distress  Breath sounds: Normal breath sounds  No wheezing, rhonchi or rales  Abdominal:      General: Bowel sounds are normal  There is no distension  Palpations: Abdomen is not rigid  There is no mass  Tenderness: There is no abdominal tenderness  There is no guarding or rebound  Musculoskeletal:      Cervical back: Normal range of motion and neck supple  Skin:     General: Skin is warm and dry  Findings: No bruising, petechiae or rash  Nails: There is no clubbing  Comments: Trace edema b/l LE, no varicosities appreciated to b/l LE   Neurological:      Mental Status: He is alert and oriented to person, place, and time  Cranial Nerves: No cranial nerve deficit  Sensory: No sensory deficit  Comments: No focal deficit appreciated         Lab Results:               Invalid input(s): LABGLOM      No results found for: HGBA1C  No results found for: CKTOTAL, CKMB, CKMBINDEX, TROPONINI    Imaging Studies:     CT lung screening 1/30/2023: 48mm ascending aortic aneurysm, no other lung findings    EKG 8/17/2021: done    I have personally reviewed pertinent reports  and I have personally reviewed pertinent films in PACS    Assessment:  Patient Active Problem List    Diagnosis Date Noted   • Thoracic aortic aneurysm (TAA) 2023   • Non-seasonal allergic rhinitis 2022   • Bilateral headaches 2022   • Sleep disorder 2021   • Alcohol abuse with withdrawal ([facility] ) 2021   • BMI 31 0-31 9,adult 2020   • Neuropathy of left lateral femoral cutaneous nerve 2020   • Weight gain, abnormal 2020   • History of 2019 novel coronavirus disease (COVID-19) 2020   • Acquired hypothyroidism 2019   • Recurrent major depressive disorder, in partial remission ([place] [address] ) 2019   • Vasculogenic erectile dysfunction 2019   • History of substance abuse ([place] [address] ) 2019     Ascending aortic aneurysm; establishment of ascending aortic surveillance    Plan:  Mr Amy Vale is here to establish care in the aortic clinic today for ascending aortic aneurysm  CT scan without contrast of the chest demonstrates maximum measurement of ascending aorta at 48cm, He will require an echocardiogram to determine aortic valve morphology for further direction on his aortic surveillance plan  I counseled the patient on his lifestyle restrictions  Education was provided in regards to the followin )  Maintaining good blood pressure control and taking antihypertensive medications as prescribed  2 )  No strenuous activity involving lifting more than 50lbs  3 )  Awareness of the warning symptoms of aortic dissection such as chest pain, back pain, shortness of breath, lightheadedness or dizziness and to seek immediate medical attention at the nearest ER   4 )  The importance of continued surveillance with visits in the Aortic Disease clinic and obtaining CT Scans as recommended  5 ) Importance of avoiding tobacco products      We recommend follow-up in the aortic clinic in six months with CTA chest and transthoracic echocardiogram for ongoing surveillance  Case discussed with Dr Fredric Peoples, D O Jena Crigler was comfortable with our recommendations, and their questions were answered to their satisfaction  Thank you for allowing us to participate in the care of this patient  The patient recently had a screening colonoscopy in 10/25/2022  Therefore GI referral is not indicated at this time  Boby Alvares PA-C  DATE: February 27, 2023  TIME: 2:58 PM    * This note was completed in part utilizing [employer] direct voice recognition software  Grammatical errors, random word insertion, spelling mistakes, and incomplete sentences may be an occasional consequence of the system secondary to software limitations, ambient noise and hardware issues  At the time of dictation, efforts were made to edit, clarify and /or correct errors  Please read the chart carefully and recognize, using context, where substitutions have occurred  If you have any questions or concerns about the context, text or information contained within the body of this dictation, please contact myself, the provider, for further clarification

## 2023-05-30 DIAGNOSIS — I71.21 ANEURYSM OF THE ASCENDING AORTA, WITHOUT RUPTURE (HCC): ICD-10-CM

## 2023-05-30 DIAGNOSIS — R06.83 SNORING: ICD-10-CM

## 2023-07-13 RX ORDER — METOPROLOL SUCCINATE 25 MG/1
25 TABLET, EXTENDED RELEASE ORAL DAILY
COMMUNITY
Start: 2023-05-30

## 2023-07-17 ENCOUNTER — OFFICE VISIT (OUTPATIENT)
Dept: FAMILY MEDICINE CLINIC | Facility: CLINIC | Age: 53
End: 2023-07-17
Payer: COMMERCIAL

## 2023-07-17 VITALS
SYSTOLIC BLOOD PRESSURE: 125 MMHG | WEIGHT: 209.4 LBS | HEIGHT: 69 IN | OXYGEN SATURATION: 97 % | DIASTOLIC BLOOD PRESSURE: 78 MMHG | HEART RATE: 77 BPM | BODY MASS INDEX: 31.01 KG/M2

## 2023-07-17 DIAGNOSIS — E03.9 ACQUIRED HYPOTHYROIDISM: Chronic | ICD-10-CM

## 2023-07-17 DIAGNOSIS — B00.1 COLD SORE: Primary | ICD-10-CM

## 2023-07-17 DIAGNOSIS — Z00.00 WELLNESS EXAMINATION: ICD-10-CM

## 2023-07-17 PROCEDURE — 99213 OFFICE O/P EST LOW 20 MIN: CPT | Performed by: FAMILY MEDICINE

## 2023-07-17 RX ORDER — VALACYCLOVIR HYDROCHLORIDE 1 G/1
1000 TABLET, FILM COATED ORAL 2 TIMES DAILY
Qty: 2 TABLET | Refills: 3 | Status: SHIPPED | OUTPATIENT
Start: 2023-07-17 | End: 2023-07-18

## 2023-07-17 NOTE — ASSESSMENT & PLAN NOTE
Should fill the prescription in the next time feels a cold sore coming on should take the Valtrex twice a day for 1 day then refill the prescription so always has on hand

## 2023-07-17 NOTE — PROGRESS NOTES
Name: Era Cash      : 1970      MRN: [id number]  Encounter Provider: Javid Doshi MD  Encounter Date: 2023   Encounter department: [address] [place] [address] PRIMARY CARE    Assessment & Plan     1. Cold sore  Assessment & Plan:  Should fill the prescription in the next time feels a cold sore coming on should take the Valtrex twice a day for 1 day then refill the prescription so always has on hand    Orders:  -     valACYclovir (VALTREX) 1,000 mg tablet; Take 1 tablet (1,000 mg total) by mouth 2 (two) times a day for 1 day As needed for cold sore    2. Acquired hypothyroidism  Assessment & Plan: We will recheck thyroid levels and adjust meds accordingly    Orders:  -     TSH, 3rd generation with Free T4 reflex; Future    3. Wellness examination  -     Renal function panel; Future         Subjective      Patient has history of hypothyroidism, substance abuse, erectile dysfunction, headaches. And depression. The headaches have been doing well. Has in the past had recurrent problems with cold sores which he now gets about once a year and had developed 1 about a week ago. Has question about using medications for these in the future. Review of Systems   HENT: Negative for congestion. Eyes: Negative for visual disturbance. Respiratory: Negative for cough, chest tightness and shortness of breath. Cardiovascular: Negative for chest pain, palpitations and leg swelling. Gastrointestinal: Negative for abdominal pain, constipation and diarrhea. Endocrine: Negative for polyuria (Nocturia x1). Genitourinary: Negative for difficulty urinating. Skin: Positive for rash (On the left upper lip area). Allergic/Immunologic: Positive for environmental allergies. Neurological: Negative for dizziness and light-headedness. Psychiatric/Behavioral: Negative for dysphoric mood and sleep disturbance. The patient is not nervous/anxious.         Current Outpatient Medications on File Prior to Visit   Medication Sig   • metoprolol succinate (TOPROL-XL) 25 mg 24 hr tablet Take 25 mg by mouth daily   • FLUoxetine (PROzac) 40 MG capsule TAKE 1 CAPSULE BY MOUTH ONCE DAILY. • levothyroxine 125 mcg tablet TAKE 1 TAB BY MOUTH DAILY IN THE EARLY MORNING   • QUEtiapine (SEROquel) 50 mg tablet TAKE 1 TABLET BY MOUTH EACH EVENING AT BEDTIME. • SUMAtriptan (IMITREX) 100 mg tablet Take 1 tablet (100 mg total) by mouth once as needed for migraine       Objective     /78 (BP Location: Right arm, Patient Position: Sitting, Cuff Size: Standard)   Pulse 77   Ht 5' 9" (1.753 m)   Wt 95 kg (209 lb 6.4 oz)   SpO2 97%   BMI 30.92 kg/m²     Physical Exam  Vitals and nursing note reviewed. Constitutional:       Appearance: Normal appearance. He is well-developed. HENT:      Head: Normocephalic. Nose: Nose normal.   Eyes:      Conjunctiva/sclera: Conjunctivae normal.   Neck:      Thyroid: No thyromegaly. Vascular: No carotid bruit. Cardiovascular:      Rate and Rhythm: Normal rate and regular rhythm. Heart sounds: Normal heart sounds. No murmur (Rate is 72) heard. Pulmonary:      Effort: Pulmonary effort is normal.      Breath sounds: Normal breath sounds. Abdominal:      General: Abdomen is flat. There is no distension. Palpations: Abdomen is soft. There is no mass. Tenderness: There is no abdominal tenderness. Musculoskeletal:         General: Normal range of motion. Cervical back: Normal range of motion and neck supple. No tenderness. Right lower leg: No edema. Left lower leg: No edema. Lymphadenopathy:      Cervical: No cervical adenopathy. Skin:     General: Skin is warm and dry. Findings: No rash. Neurological:      Mental Status: He is alert and oriented to person, place, and time. Motor: No weakness.       Coordination: Coordination normal.      Gait: Gait normal.      Deep Tendon Reflexes: Reflexes normal.   Psychiatric: Mood and Affect: Mood normal.       Cristy Lopez MD

## 2023-07-17 NOTE — PATIENT INSTRUCTIONS
Overall seems to be doing well. I did give prescription for Valtrex to be taken 1 tablet twice a day for 1 day as needed for cold sores. We will recheck thyroid levels and also renal panel as part of wellness check.   Continue current meds

## 2023-07-25 ENCOUNTER — TELEPHONE (OUTPATIENT)
Dept: FAMILY MEDICINE CLINIC | Facility: CLINIC | Age: 53
End: 2023-07-25

## 2023-07-25 NOTE — TELEPHONE ENCOUNTER
Patient states he been having trouble falling and staying asleep. Questioning if it is safe to take Unisom with his other medications. Please advise.

## 2023-08-21 ENCOUNTER — HOSPITAL ENCOUNTER (OUTPATIENT)
Dept: CT IMAGING | Facility: HOSPITAL | Age: 53
Discharge: HOME | End: 2023-08-21
Payer: COMMERCIAL

## 2023-08-21 ENCOUNTER — HOSPITAL ENCOUNTER (OUTPATIENT)
Dept: NON INVASIVE DIAGNOSTICS | Facility: HOSPITAL | Age: 53
Discharge: HOME | End: 2023-08-21
Payer: COMMERCIAL

## 2023-08-21 ENCOUNTER — HOSPITAL ENCOUNTER (OUTPATIENT)
Dept: ULTRASOUND IMAGING | Facility: HOSPITAL | Age: 53
Discharge: HOME | End: 2023-08-21
Payer: COMMERCIAL

## 2023-08-21 VITALS
BODY MASS INDEX: 31.02 KG/M2 | DIASTOLIC BLOOD PRESSURE: 78 MMHG | WEIGHT: 209.44 LBS | HEART RATE: 74 BPM | SYSTOLIC BLOOD PRESSURE: 128 MMHG | HEIGHT: 69 IN

## 2023-08-21 DIAGNOSIS — Z87.891 FORMER TOBACCO USE: ICD-10-CM

## 2023-08-21 DIAGNOSIS — Z87.898 HISTORY OF ALCOHOL USE: ICD-10-CM

## 2023-08-21 DIAGNOSIS — I71.20 THORACIC AORTIC ANEURYSM WITHOUT RUPTURE, UNSPECIFIED PART (HCC): ICD-10-CM

## 2023-08-21 DIAGNOSIS — R06.83 SNORING: ICD-10-CM

## 2023-08-21 DIAGNOSIS — I71.21 ANEURYSM OF THE ASCENDING AORTA, WITHOUT RUPTURE (HCC): ICD-10-CM

## 2023-08-21 DIAGNOSIS — E66.9 OBESITY (BMI 30.0-34.9): ICD-10-CM

## 2023-08-21 LAB
AORTIC ROOT: 4.5 CM
AORTIC VALVE MEAN VELOCITY: 9.8 M/S
APICAL FOUR CHAMBER EJECTION FRACTION: 63 %
ASCENDING AORTA: 4.4 CM
AV AREA BY CONTINUOUS VTI: 3.8 CM2
AV AREA PEAK VELOCITY: 3.9 CM2
AV LVOT MEAN GRADIENT: 2 MMHG
AV LVOT PEAK GRADIENT: 4 MMHG
AV MEAN GRADIENT: 4 MMHG
AV PEAK GRADIENT: 8 MMHG
AV VALVE AREA: 3.81 CM2
AV VELOCITY RATIO: 0.68
DOP CALC AO PEAK VEL: 1.43 M/S
DOP CALC AO VTI: 32.46 CM
DOP CALC LVOT AREA: 5.72 CM2
DOP CALC LVOT CARDIAC INDEX: 3.55 L/MIN/M2
DOP CALC LVOT CARDIAC OUTPUT: 7.5 L/MIN
DOP CALC LVOT DIAMETER: 2.7 CM
DOP CALC LVOT PEAK VEL VTI: 21.61 CM
DOP CALC LVOT PEAK VEL: 0.97 M/S
DOP CALC LVOT STROKE INDEX: 57.3 ML/M2
DOP CALC LVOT STROKE VOLUME: 123.67 CM3
E WAVE DECELERATION TIME: 171 MS
FRACTIONAL SHORTENING: 10 % (ref 28–44)
INTERVENTRICULAR SEPTUM IN DIASTOLE (PARASTERNAL SHORT AXIS VIEW): 1.2 CM
INTERVENTRICULAR SEPTUM: 1.2 CM (ref 0.6–1.1)
LAAS-AP2: 27.4 CM2
LAAS-AP4: 24.3 CM2
LEFT ATRIUM SIZE: 3.4 CM
LEFT ATRIUM VOLUME (MOD BIPLANE): 94 ML
LEFT INTERNAL DIMENSION IN SYSTOLE: 3.7 CM (ref 2.1–4)
LEFT VENTRICLE DIASTOLIC VOLUME (MOD BIPLANE): 142 ML
LEFT VENTRICLE SYSTOLIC VOLUME (MOD BIPLANE): 51 ML
LEFT VENTRICULAR INTERNAL DIMENSION IN DIASTOLE: 4.1 CM (ref 3.5–6)
LEFT VENTRICULAR POSTERIOR WALL IN END DIASTOLE: 1.2 CM
LEFT VENTRICULAR STROKE VOLUME: 16 ML
LV EF: 64 %
LVSV (TEICH): 16 ML
MV E'TISSUE VEL-LAT: 12 CM/S
MV E'TISSUE VEL-SEP: 13 CM/S
MV PEAK A VEL: 0.7 M/S
MV PEAK E VEL: 86 CM/S
MV STENOSIS PRESSURE HALF TIME: 49 MS
MV VALVE AREA P 1/2 METHOD: 4.49 CM2
PV PEAK GRADIENT: 2 MMHG
RIGHT ATRIUM AREA SYSTOLE A4C: 25.4 CM2
RIGHT VENTRICLE ID DIMENSION: 3.8 CM
SL CV LEFT ATRIUM LENGTH A2C: 5.9 CM
SL CV LV EF: 60
SL CV PED ECHO LEFT VENTRICLE DIASTOLIC VOLUME (MOD BIPLANE) 2D: 73 ML
SL CV PED ECHO LEFT VENTRICLE SYSTOLIC VOLUME (MOD BIPLANE) 2D: 57 ML
TR MAX PG: 23 MMHG
TR PEAK VELOCITY: 2.4 M/S
TRICUSPID ANNULAR PLANE SYSTOLIC EXCURSION: 3.2 CM
TRICUSPID VALVE PEAK REGURGITATION VELOCITY: 2.4 M/S

## 2023-08-21 PROCEDURE — G1004 CDSM NDSC: HCPCS

## 2023-08-21 PROCEDURE — 93306 TTE W/DOPPLER COMPLETE: CPT

## 2023-08-21 PROCEDURE — 76775 US EXAM ABDO BACK WALL LIM: CPT

## 2023-08-21 PROCEDURE — 71275 CT ANGIOGRAPHY CHEST: CPT

## 2023-08-21 RX ADMIN — IOHEXOL 100 ML: 350 INJECTION, SOLUTION INTRAVENOUS at 09:41

## 2023-08-28 ENCOUNTER — OFFICE VISIT (OUTPATIENT)
Dept: CARDIAC SURGERY | Facility: CLINIC | Age: 53
End: 2023-08-28
Payer: COMMERCIAL

## 2023-08-28 VITALS
SYSTOLIC BLOOD PRESSURE: 117 MMHG | TEMPERATURE: 98.9 F | HEIGHT: 69 IN | WEIGHT: 209.9 LBS | HEART RATE: 80 BPM | OXYGEN SATURATION: 97 % | DIASTOLIC BLOOD PRESSURE: 77 MMHG | BODY MASS INDEX: 31.09 KG/M2

## 2023-08-28 DIAGNOSIS — F19.11 HISTORY OF SUBSTANCE ABUSE (HCC): Chronic | ICD-10-CM

## 2023-08-28 DIAGNOSIS — I71.21 ANEURYSM OF ASCENDING AORTA WITHOUT RUPTURE (HCC): Primary | ICD-10-CM

## 2023-08-28 DIAGNOSIS — E03.9 ACQUIRED HYPOTHYROIDISM: Chronic | ICD-10-CM

## 2023-08-28 PROCEDURE — 99214 OFFICE O/P EST MOD 30 MIN: CPT | Performed by: THORACIC SURGERY (CARDIOTHORACIC VASCULAR SURGERY)

## 2023-08-28 NOTE — LETTER
August 28, 2023     [place], [address] [place] [id number]    Patient: Iva Jarvis   YOB: 1970   Date of Visit: 8/28/2023       Dear Dr. Jayshree Griffith: Thank you for referring Iva Jarvis to me for evaluation. Below are my notes for this consultation. If you have questions, please do not hesitate to call me. I look forward to following your patient along with you. Sincerely,        Mamie Fleischer, DO        CC: MD Vj Seo PA-C  8/28/2023  1:56 PM  Attested  Aortic Surveillance - Cardiothoracic Surgery   Iva Jarvis 46 y.o. male MRN: [id number]    History of Present Illness: Iva Jarvis is a 46y.o. year old male who presents today for 6 month follow up for ongoing surveillance of previously identified ascending aortic aneurysm. They were most recently evaluated in our office with CT imaging 2/27/2023. Patient had a CT scan for lung cancer screening in 1/2023 which incidentally found an ascending aortic aneurysm measuring 48mmm in diameter. CT obtained 8/21/23 shows the aneurysm to be ~47mm & stable. Recent ECHO on 8/21/23 shows EF 60%, possible bicuspid aortic valve, mild AI, no AS with aortic root measuring 4.5mm. He admits to abiding by his lifting & exertion restrictions since his last visit. He would like to be active and start working out more. He denies chest pain, palpitations, SOB, GOA, LE edema b/l, back pain, arm pain, numbness/tingling/paresthesias in UE b/l, HA, lightheadedness, dizziness, presyncopal symptoms, or syncopal events today or since his last visit. No hanges to his medical history since his last visit. He denies any family history of aortic aneurysms or sudden cardiac death to his knowledge. BP is usually well controlled on Toprol XL and today BP is 120/81. He sees [facility] PA-C as his cardiologist who manages his cardiac medications.  Denies tobacco use (quit 2015), or drug/ETOH use of any kind (h/o substance abuse, clean for 2 years). Past Medical History:  Past Medical History:   Diagnosis Date   • Allergic rhinitis    • Depression    • Erectile dysfunction    • Former tobacco use    • History of alcohol use     2 years of abuse, no liver dysfunction   • History of COVID-19    • History of herniated intervertebral disc     lumbar,affecting left side   • History of substance abuse ([address])     opiate use, no IVDA, no HIV/hepatitis, prior suboxone use   • Hypothyroidism    • Hypothyroidism    • Migraines    • Neuropathy    • Obesity (BMI 30.0-34. 9)    • RLS (restless legs syndrome)    • Thoracic aortic aneurysm (TAA) (HCC)     48mm         Past Surgical History:   Past Surgical History:   Procedure Laterality Date   • DENTAL SURGERY      extraction requiring anesthesia   • NASAL POLYP EXCISION     • NASAL SEPTUM SURGERY     • TONSILLECTOMY           Family History:  Family History   Problem Relation Age of Onset   • Breast cancer Mother    • Kidney cancer Mother    • Breast cancer Family    • Kidney cancer Family          Social History:    Social History     Substance and Sexual Activity   Alcohol Use Yes   • Alcohol/week: 5.0 standard drinks of alcohol   • Types: 5 Shots of liquor per week    Comment: 5 shots daily     Social History     Substance and Sexual Activity   Drug Use Never     Social History     Tobacco Use   Smoking Status Former   • Packs/day: 1.00   • Years: 25.00   • Total pack years: 25.00   • Types: Cigarettes   • Quit date: 2015   • Years since quittin.5   Smokeless Tobacco Never       Home Medications:   Prior to Admission medications    Medication Sig Start Date End Date Taking? Authorizing Provider   FLUoxetine (PROzac) 40 MG capsule TAKE 1 CAPSULE BY MOUTH ONCE DAILY.  4/3/23   Amber Halsted, MD   levothyroxine 125 mcg tablet TAKE 1 TAB BY MOUTH DAILY IN THE EARLY MORNING 4/3/23   Amber Halsted, MD   metoprolol succinate (TOPROL-XL) 25 mg 24 hr tablet Take 25 mg by mouth daily 5/30/23   Historical Provider, MD   QUEtiapine (SEROquel) 50 mg tablet TAKE 1 TABLET BY MOUTH EACH EVENING AT BEDTIME. 4/3/23   Vicenta Bar MD   SUMAtriptan (IMITREX) 100 mg tablet Take 1 tablet (100 mg total) by mouth once as needed for migraine 4/18/23   Vicenta Bar MD   valACYclovir (VALTREX) 1,000 mg tablet Take 1 tablet (1,000 mg total) by mouth 2 (two) times a day for 1 day As needed for cold sore 7/17/23 7/18/23  Vicenta Bar MD       Allergies: Allergies   Allergen Reactions   • Pollen Extract Allergic Rhinitis       Review of Systems:     Review of Systems   Constitutional: Negative for chills and fever. HENT: Negative for ear pain and sore throat. Eyes: Negative for pain and visual disturbance. Respiratory: Negative for cough and shortness of breath. Cardiovascular: Negative for chest pain, palpitations and leg swelling. Gastrointestinal: Negative for abdominal pain and vomiting. Genitourinary: Negative for dysuria and hematuria. Musculoskeletal: Negative for arthralgias and back pain. Skin: Negative for color change and rash. Neurological: Negative for seizures and syncope. All other systems reviewed and are negative. Vital Signs: There were no vitals filed for this visit. Physical Exam:     Physical Exam  Vitals reviewed. Constitutional:       General: He is not in acute distress. Appearance: Normal appearance. He is obese. HENT:      Head: Normocephalic and atraumatic. Mouth/Throat:      Mouth: Mucous membranes are moist.      Pharynx: Oropharynx is clear. Eyes:      Extraocular Movements: Extraocular movements intact. Conjunctiva/sclera: Conjunctivae normal.      Pupils: Pupils are equal, round, and reactive to light. Cardiovascular:      Rate and Rhythm: Normal rate and regular rhythm. Heart sounds: Normal heart sounds.  No murmur heard.  Pulmonary:      Effort: Pulmonary effort is normal.      Breath sounds: Normal breath sounds. Abdominal:      General: Abdomen is flat. Bowel sounds are normal.      Palpations: Abdomen is soft. Musculoskeletal:         General: No swelling. Normal range of motion. Cervical back: Normal range of motion and neck supple. Skin:     General: Skin is warm and dry. Neurological:      General: No focal deficit present. Mental Status: He is alert and oriented to person, place, and time. Psychiatric:         Mood and Affect: Mood normal.         Behavior: Behavior normal.         Lab Results:               Invalid input(s): "LABGLOM"      No results found for: "HGBA1C"  No results found for: "CKTOTAL", "CKMB", "CKMBINDEX", "TROPONINI"    Imaging Studies:     CT Chest:   FINDINGS:     AORTA/ARTERIAL VASCULATURE:  Of note, study is not cardiac gated noting mild motion artifact. Within these confines, the ascending thoracic aorta remains stable measuring 47 mm in diameter when measured using oblique reformatted imaging. Remaining thoracic and upper abdominal aorta   are normal in caliber without atherosclerotic disease. Shared origin of the left common carotid artery and brachiocephalic artery. Supra aortic arteries are patent in their visualized extent. Patent celiac and partially imaged SMA. Patent partially imaged bilateral main renal arteries. Patent superior right renal accessory artery. OTHER FINDINGS:     LUNGS: Unchanged benign 3 mm perifissural nodule on the right. There is no tracheal or endobronchial lesion. PLEURA:  Unremarkable. HEART/PULMONARY ARTERIAL TREE:  Unremarkable for patient's age. MEDIASTINUM AND OSKAR:  Unremarkable. CHEST WALL AND LOWER NECK:   Mild bilateral gynecomastia. VISUALIZED STRUCTURES IN THE UPPER ABDOMEN: Benign adenoma involving the left adrenal gland measuring 2 cm, stable. Ibis Phalen      VISUALIZED OSSEOUS STRUCTURES:  No acute fracture or destructive osseous lesion. IMPRESSION:     Stable ascending thoracic aneurysm measuring 47 mm. Echocardiogram:   •  Left Ventricle: Left ventricular cavity size is normal. Wall thickness is normal. The left ventricular ejection fraction is 60%. Systolic function is normal. Wall motion is normal. Diastolic function is normal.  •  Aortic Valve: The aortic valve is possibly bicuspid. The leaflets are not calcified. There is mild regurgitation with a centrally directed jet. The aortic valve has no significant stenosis. •  Mitral Valve: There is mild regurgitation with a centrally directed jet. •  Tricuspid Valve: There is mild regurgitation. The tricuspid valve regurgitation jet is central. The right ventricular systolic pressure is normal.  •  Aorta: The aortic root is moderately dilated. Findings    Left Ventricle Left ventricular cavity size is normal. Wall thickness is normal. The left ventricular ejection fraction is 60%. Systolic function is normal.  Wall motion is normal. Diastolic function is normal.      Right Ventricle Right ventricular cavity size is normal. Systolic function is normal. Wall thickness is normal.      Left Atrium The atrium is normal in size. Right Atrium The atrium is normal in size. Aortic Valve The aortic valve is possibly bicuspid. The leaflets are not calcified. There is mild regurgitation with a centrally directed jet. The aortic valve has no significant stenosis. Mitral Valve Mitral valve structure is normal. There is mild regurgitation with a centrally directed jet. There is no evidence of stenosis. Tricuspid Valve Tricuspid valve structure is normal. There is mild regurgitation. The tricuspid valve regurgitation jet is central. There is no evidence of stenosis. The right ventricular systolic pressure is normal.      Pulmonic Valve Pulmonic valve structure is normal. There is no evidence of regurgitation. There is no evidence of stenosis. Ascending Aorta The aortic root is moderately dilated. IVC/SVC The inferior vena cava is normal in size. Pericardium There is no pericardial effusion. The pericardium is normal in appearance.         Left Ventricle Measurements    Function/Volumes   A4C EF 63 %         LVOT stroke volume 123.67 cm3         LVOT stroke volume index 57.3 ml/m2         LV Diastolic Volume (BP) 205 mL         LV Systolic Volume (BP) 51 mL         EF 64 %         LVOT Cardiac Output 7.5 l/min         LVOT Cardiac Index 3.55 l/min/m2         Dimensions   LVIDd 4.1 cm         LVIDS 3.7 cm         IVSd 1.2 cm         LVPWd 1.2 cm         LVOT area 5.72 cm2         FS 10 %         Diastolic Filling   MV E' Tissue Velocity Septal 13 cm/s         MV E' Tissue Velocity Lateral 12 cm/s         E wave deceleration time 171 ms         MV Peak E Mac 86 cm/s         MV Peak A Mac 0.7 m/s          Report Measurements   AV LVOT peak gradient 4 mmHg              Interventricular Septum Measurements    Shunt Ratio   LVOT peak VTI 21.61 cm         LVOT peak mac 0.97 m/s              Right Ventricle Measurements    Dimensions   RVID d 3.8 cm         Tricuspid annular plane systolic excursion 3.2 cm               Left Atrium Measurements    Dimensions   LA size 3.4 cm         LA length (A2C) 5.9 cm         Volumes   LA volume (BP) 94 mL               Right Atrium Measurements    Dimensions   RAA A4C 25.4 cm2               Atrial Septum Measurements    Shunt Ratio   LVOT peak VTI 21.61 cm         LVOT peak mac 0.97 m/s               Aortic Valve Measurements    Stenosis   Aortic valve peak velocity 1.43 m/s         LVOT peak mac 0.97 m/s         Ao VTI 32.46 cm         LVOT peak VTI 21.61 cm         AV mean gradient 4 mmHg         LVOT mn grad 2 mmHg         AV peak gradient 8 mmHg         AV LVOT peak gradient 4 mmHg         Area/Dimensions   DVI 0.68          AV valve area 3.81 cm2         AV area by cont VTI 3.8 cm2         AV area peak mac 3.9 cm2         LVOT diameter 2.7 cm         LVOT area 5.72 cm2               Mitral Valve Measurements    Stenosis   MV stenosis pressure 1/2 time 49 ms         MV valve area p 1/2 method 4.49 cm2               Tricuspid Valve Measurements    RVSP Parameters   TR Peak Mac 2.4 m/s         Triscuspid Valve Regurgitation Peak Gradient 23 mmHg               Pulmonic Valve Measurements    Stenosis   PV peak gradient antegrade 2 mmHg               Aorta Measurements    Aortic Dimensions   Ao root 4.5 cm         Asc Ao 4.4 cm               I have personally reviewed pertinent reports. and I have personally reviewed pertinent films in PACS    Assessment:  Patient Active Problem List    Diagnosis Date Noted   • Cold sore 07/17/2023   • Thoracic aortic aneurysm (TAA) ([address]) 02/27/2023   • Non-seasonal allergic rhinitis 07/18/2022   • Bilateral headaches 03/18/2022   • Sleep disorder 09/03/2021   • Alcohol abuse with withdrawal ([address]) 08/13/2021   • BMI 31.0-31.9,adult 12/14/2020   • Neuropathy of left lateral femoral cutaneous nerve 06/29/2020   • Weight gain, abnormal 06/08/2020   • History of 2019 novel coronavirus disease (COVID-19) 06/03/2020   • Acquired hypothyroidism 09/30/2019   • Recurrent major depressive disorder, in partial remission ([address]) 09/30/2019   • Vasculogenic erectile dysfunction 09/30/2019   • History of substance abuse ([address]) 09/30/2019     Ascending aortic aneurysm    Plan:     CTA chest performed prior to the visit today was reviewed. Ascending aorta was measured at 47 mm at it's greatest diameter. These findings were confirmed and shared with the patient today. As there has been no interval enlargement in 6 months,  follow-up monitoring is the treatment plan. Arrangements have been made for future surveillance to be completed with CT chest, without contrast in 1 Years.     Chikis Stuart does not meet the following Ascending aortic aneurysm surgical triggers:    * 4.5 cm or > in the setting of + FH of rupture or dissection  * 5 cm with moderate or worse aortic valve disease  *  5.5 cm regardless of aortic valve function and without genetically associated aortic disease   *  Aortic growth > 4mm / year  *  Bicuspid aortic valve with valve dysfunction enough to meet indications for surgery and concomitant ascending aortic aneurysm 4.5 cm or >  * 4.5 cm or > in setting of existing connective tissue disease of Marfan's syndrome, Moo-Danlos syndrome or familiar aneurysms syndrome      Theador Maria Lt was comfortable with our recommendations, and their questions were answered to their satisfaction. Thank you for allowing us to participate in the care of this patient. Aortic Aneurysm Instructions were provided to the patient as follows:    1. No lifting more than 50 pounds  2. Maintain a controlled blood pressure with a goal of 120/80. 3. Follow up in Aortic Clinic as recommended with radiology follow up as instructed  4. Report to the ER or call 911 immediately with the following signs / symptoms: sudden onset of back pain, chest pain or shortness of breath. 5. Maintain follow up with cardiologist  6. Patient verbalized understanding of the information discussed and agrees to the treatment plan. The patient recently had a screening colonoscopy in 2022. Therefore GI referral is not indicated at this time. SIGNATURE: Sandy Silvestre  DATE: 08/28/23  TIME: 1:09 PM  Attestation signed by Rip Simon DO at 8/28/2023  2:01 PM:  I supervised the Advanced Practitioner. ? I performed, in its entirety, the assessment/plan component of the visit. I agree with the Advanced Practitioner's note with the following additions/exceptions:      Mr. Bessy Sharma presents for routine surveillance of his ascending aortic aneurysm. CT and TTE were reviewed by me personally. CT showed no significant change at 47mm. TTE showed a possibly bicuspid aortic valve with mild AI.   Based on these findings, I recommend continued smoking cessation, blood pressure control, surveillance and aortic precautions. SBP goal should be 110s-120s with resting heart rate goal 60-70. The patient will return to the office in 1 year for review of surveillance imaging.     Radha Hagen,  08/28/23

## 2023-08-28 NOTE — PATIENT INSTRUCTIONS
Aortic Aneurysm Instructions were provided to the patient as follows:    1. No lifting more than 50 pounds,cardio and light lifting ok  2. Maintain a controlled blood pressure with a goal of 120/80. 3. Follow up in Aortic Clinic as recommended with radiology follow up as instructed  4. Report to the ER or call 911 immediately with the following signs / symptoms: sudden onset of back pain, chest pain or shortness of breath.   5. Maintain follow up with cardiologist

## 2023-08-28 NOTE — PROGRESS NOTES
Aortic Surveillance - Cardiothoracic Surgery   Nicola Birch 46 y.o. male MRN: [id number]    History of Present Illness: Nicola Birch is a 46y.o. year old male who presents today for 6 month follow up for ongoing surveillance of previously identified ascending aortic aneurysm. They were most recently evaluated in our office with CT imaging 2/27/2023. Patient had a CT scan for lung cancer screening in 1/2023 which incidentally found an ascending aortic aneurysm measuring 48mmm in diameter. CT obtained 8/21/23 shows the aneurysm to be ~47mm & stable. Recent ECHO on 8/21/23 shows EF 60%, possible bicuspid aortic valve, mild AI, no AS with aortic root measuring 4.5mm. He admits to abiding by his lifting & exertion restrictions since his last visit. He would like to be active and start working out more. He denies chest pain, palpitations, SOB, GAO, LE edema b/l, back pain, arm pain, numbness/tingling/paresthesias in UE b/l, HA, lightheadedness, dizziness, presyncopal symptoms, or syncopal events today or since his last visit. No hanges to his medical history since his last visit. He denies any family history of aortic aneurysms or sudden cardiac death to his knowledge. BP is usually well controlled on Toprol XL and today BP is 120/81. He sees [facility] PA-C as his cardiologist who manages his cardiac medications. Denies tobacco use (quit 2015), or drug/ETOH use of any kind (h/o substance abuse, clean for 2 years).      Past Medical History:  Past Medical History:   Diagnosis Date   • Allergic rhinitis    • Depression    • Erectile dysfunction    • Former tobacco use    • History of alcohol use     2 years of abuse, no liver dysfunction   • History of COVID-19    • History of herniated intervertebral disc     lumbar,affecting left side   • History of substance abuse ([address])     opiate use, no IVDA, no HIV/hepatitis, prior suboxone use   • Hypothyroidism    • Hypothyroidism    • Migraines    • Neuropathy    • Obesity (BMI 30.0-34. 9)    • RLS (restless legs syndrome)    • Thoracic aortic aneurysm (TAA) (HCC)     48mm         Past Surgical History:   Past Surgical History:   Procedure Laterality Date   • DENTAL SURGERY      extraction requiring anesthesia   • NASAL POLYP EXCISION     • NASAL SEPTUM SURGERY     • TONSILLECTOMY           Family History:  Family History   Problem Relation Age of Onset   • Breast cancer Mother    • Kidney cancer Mother    • Breast cancer Family    • Kidney cancer Family          Social History:    Social History     Substance and Sexual Activity   Alcohol Use Yes   • Alcohol/week: 5.0 standard drinks of alcohol   • Types: 5 Shots of liquor per week    Comment: 5 shots daily     Social History     Substance and Sexual Activity   Drug Use Never     Social History     Tobacco Use   Smoking Status Former   • Packs/day: 1.00   • Years: 25.00   • Total pack years: 25.00   • Types: Cigarettes   • Quit date: 2015   • Years since quittin.5   Smokeless Tobacco Never       Home Medications:   Prior to Admission medications    Medication Sig Start Date End Date Taking? Authorizing Provider   FLUoxetine (PROzac) 40 MG capsule TAKE 1 CAPSULE BY MOUTH ONCE DAILY. 4/3/23   Cindi Griffith MD   levothyroxine 125 mcg tablet TAKE 1 TAB BY MOUTH DAILY IN THE EARLY MORNING 4/3/23   Cindi Griffith MD   metoprolol succinate (TOPROL-XL) 25 mg 24 hr tablet Take 25 mg by mouth daily 23   Historical Provider, MD   QUEtiapine (SEROquel) 50 mg tablet TAKE 1 TABLET BY MOUTH EACH EVENING AT BEDTIME. 4/3/23   Cindi Griffith MD   SUMAtriptan (IMITREX) 100 mg tablet Take 1 tablet (100 mg total) by mouth once as needed for migraine 23   Cindi Griffith MD   valACYclovir (VALTREX) 1,000 mg tablet Take 1 tablet (1,000 mg total) by mouth 2 (two) times a day for 1 day As needed for cold sore 23  Cindi Griffith MD       Allergies:   Allergies   Allergen Reactions   • Pollen Extract Allergic Rhinitis       Review of Systems:     Review of Systems   Constitutional: Negative for chills and fever. HENT: Negative for ear pain and sore throat. Eyes: Negative for pain and visual disturbance. Respiratory: Negative for cough and shortness of breath. Cardiovascular: Negative for chest pain, palpitations and leg swelling. Gastrointestinal: Negative for abdominal pain and vomiting. Genitourinary: Negative for dysuria and hematuria. Musculoskeletal: Negative for arthralgias and back pain. Skin: Negative for color change and rash. Neurological: Negative for seizures and syncope. All other systems reviewed and are negative. Vital Signs: There were no vitals filed for this visit. Physical Exam:     Physical Exam  Vitals reviewed. Constitutional:       General: He is not in acute distress. Appearance: Normal appearance. He is obese. HENT:      Head: Normocephalic and atraumatic. Mouth/Throat:      Mouth: Mucous membranes are moist.      Pharynx: Oropharynx is clear. Eyes:      Extraocular Movements: Extraocular movements intact. Conjunctiva/sclera: Conjunctivae normal.      Pupils: Pupils are equal, round, and reactive to light. Cardiovascular:      Rate and Rhythm: Normal rate and regular rhythm. Heart sounds: Normal heart sounds. No murmur heard. Pulmonary:      Effort: Pulmonary effort is normal.      Breath sounds: Normal breath sounds. Abdominal:      General: Abdomen is flat. Bowel sounds are normal.      Palpations: Abdomen is soft. Musculoskeletal:         General: No swelling. Normal range of motion. Cervical back: Normal range of motion and neck supple. Skin:     General: Skin is warm and dry. Neurological:      General: No focal deficit present. Mental Status: He is alert and oriented to person, place, and time.    Psychiatric:         Mood and Affect: Mood normal.         Behavior: Behavior normal.         Lab Results:               Invalid input(s): "LABGLOM"      No results found for: "HGBA1C"  No results found for: "CKTOTAL", "CKMB", "CKMBINDEX", "TROPONINI"    Imaging Studies:     CT Chest:   FINDINGS:     AORTA/ARTERIAL VASCULATURE:  Of note, study is not cardiac gated noting mild motion artifact. Within these confines, the ascending thoracic aorta remains stable measuring 47 mm in diameter when measured using oblique reformatted imaging. Remaining thoracic and upper abdominal aorta   are normal in caliber without atherosclerotic disease. Shared origin of the left common carotid artery and brachiocephalic artery. Supra aortic arteries are patent in their visualized extent.     Patent celiac and partially imaged SMA. Patent partially imaged bilateral main renal arteries. Patent superior right renal accessory artery.     OTHER FINDINGS:     LUNGS: Unchanged benign 3 mm perifissural nodule on the right. There is no tracheal or endobronchial lesion.     PLEURA:  Unremarkable.     HEART/PULMONARY ARTERIAL TREE:  Unremarkable for patient's age.     MEDIASTINUM AND OSKAR:  Unremarkable.     CHEST WALL AND LOWER NECK:   Mild bilateral gynecomastia.     VISUALIZED STRUCTURES IN THE UPPER ABDOMEN: Benign adenoma involving the left adrenal gland measuring 2 cm, stable. .     VISUALIZED OSSEOUS STRUCTURES:  No acute fracture or destructive osseous lesion.     IMPRESSION:     Stable ascending thoracic aneurysm measuring 47 mm. Echocardiogram:   •  Left Ventricle: Left ventricular cavity size is normal. Wall thickness is normal. The left ventricular ejection fraction is 60%. Systolic function is normal. Wall motion is normal. Diastolic function is normal.  •  Aortic Valve: The aortic valve is possibly bicuspid. The leaflets are not calcified. There is mild regurgitation with a centrally directed jet. The aortic valve has no significant stenosis. •  Mitral Valve:  There is mild regurgitation with a centrally directed jet. •  Tricuspid Valve: There is mild regurgitation. The tricuspid valve regurgitation jet is central. The right ventricular systolic pressure is normal.  •  Aorta: The aortic root is moderately dilated.     Findings    Left Ventricle Left ventricular cavity size is normal. Wall thickness is normal. The left ventricular ejection fraction is 60%. Systolic function is normal.  Wall motion is normal. Diastolic function is normal.      Right Ventricle Right ventricular cavity size is normal. Systolic function is normal. Wall thickness is normal.      Left Atrium The atrium is normal in size. Right Atrium The atrium is normal in size. Aortic Valve The aortic valve is possibly bicuspid. The leaflets are not calcified. There is mild regurgitation with a centrally directed jet. The aortic valve has no significant stenosis. Mitral Valve Mitral valve structure is normal. There is mild regurgitation with a centrally directed jet. There is no evidence of stenosis. Tricuspid Valve Tricuspid valve structure is normal. There is mild regurgitation. The tricuspid valve regurgitation jet is central. There is no evidence of stenosis. The right ventricular systolic pressure is normal.      Pulmonic Valve Pulmonic valve structure is normal. There is no evidence of regurgitation. There is no evidence of stenosis. Ascending Aorta The aortic root is moderately dilated. IVC/SVC The inferior vena cava is normal in size. Pericardium There is no pericardial effusion. The pericardium is normal in appearance.         Left Ventricle Measurements    Function/Volumes   A4C EF 63 %         LVOT stroke volume 123.67 cm3         LVOT stroke volume index 57.3 ml/m2         LV Diastolic Volume (BP) 128 mL         LV Systolic Volume (BP) 51 mL         EF 64 %         LVOT Cardiac Output 7.5 l/min         LVOT Cardiac Index 3.55 l/min/m2         Dimensions   LVIDd 4.1 cm         LVIDS 3.7 cm         IVSd 1.2 cm LVPWd 1.2 cm         LVOT area 5.72 cm2         FS 10 %         Diastolic Filling   MV E' Tissue Velocity Septal 13 cm/s         MV E' Tissue Velocity Lateral 12 cm/s         E wave deceleration time 171 ms         MV Peak E Mac 86 cm/s         MV Peak A Mac 0.7 m/s          Report Measurements   AV LVOT peak gradient 4 mmHg              Interventricular Septum Measurements    Shunt Ratio   LVOT peak VTI 21.61 cm         LVOT peak mac 0.97 m/s              Right Ventricle Measurements    Dimensions   RVID d 3.8 cm         Tricuspid annular plane systolic excursion 3.2 cm               Left Atrium Measurements    Dimensions   LA size 3.4 cm         LA length (A2C) 5.9 cm         Volumes   LA volume (BP) 94 mL               Right Atrium Measurements    Dimensions   RAA A4C 25.4 cm2               Atrial Septum Measurements    Shunt Ratio   LVOT peak VTI 21.61 cm         LVOT peak mac 0.97 m/s               Aortic Valve Measurements    Stenosis   Aortic valve peak velocity 1.43 m/s         LVOT peak mac 0.97 m/s         Ao VTI 32.46 cm         LVOT peak VTI 21.61 cm         AV mean gradient 4 mmHg         LVOT mn grad 2 mmHg         AV peak gradient 8 mmHg         AV LVOT peak gradient 4 mmHg         Area/Dimensions   DVI 0.68          AV valve area 3.81 cm2         AV area by cont VTI 3.8 cm2         AV area peak mac 3.9 cm2         LVOT diameter 2.7 cm         LVOT area 5.72 cm2               Mitral Valve Measurements    Stenosis   MV stenosis pressure 1/2 time 49 ms         MV valve area p 1/2 method 4.49 cm2               Tricuspid Valve Measurements    RVSP Parameters   TR Peak Mac 2.4 m/s         Triscuspid Valve Regurgitation Peak Gradient 23 mmHg               Pulmonic Valve Measurements    Stenosis   PV peak gradient antegrade 2 mmHg               Aorta Measurements    Aortic Dimensions   Ao root 4.5 cm         Asc Ao 4.4 cm               I have personally reviewed pertinent reports.    and I have personally reviewed pertinent films in PACS    Assessment:  Patient Active Problem List    Diagnosis Date Noted   • Cold sore 07/17/2023   • Thoracic aortic aneurysm (TAA) ([address]) 02/27/2023   • Non-seasonal allergic rhinitis 07/18/2022   • Bilateral headaches 03/18/2022   • Sleep disorder 09/03/2021   • Alcohol abuse with withdrawal ([address]) 08/13/2021   • BMI 31.0-31.9,adult 12/14/2020   • Neuropathy of left lateral femoral cutaneous nerve 06/29/2020   • Weight gain, abnormal 06/08/2020   • History of 2019 novel coronavirus disease (COVID-19) 06/03/2020   • Acquired hypothyroidism 09/30/2019   • Recurrent major depressive disorder, in partial remission ([address]) 09/30/2019   • Vasculogenic erectile dysfunction 09/30/2019   • History of substance abuse ([address]) 09/30/2019     Ascending aortic aneurysm    Plan:     CTA chest performed prior to the visit today was reviewed. Ascending aorta was measured at 47 mm at it's greatest diameter. These findings were confirmed and shared with the patient today. As there has been no interval enlargement in 6 months,  follow-up monitoring is the treatment plan. Arrangements have been made for future surveillance to be completed with CT chest, without contrast in 1 Years.     Rory Bledsoe does not meet the following Ascending aortic aneurysm surgical triggers:    * 4.5 cm or > in the setting of + FH of rupture or dissection  * 5 cm with moderate or worse aortic valve disease  *  5.5 cm regardless of aortic valve function and without genetically associated aortic disease   *  Aortic growth > 4mm / year  *  Bicuspid aortic valve with valve dysfunction enough to meet indications for surgery and concomitant ascending aortic aneurysm 4.5 cm or >  * 4.5 cm or > in setting of existing connective tissue disease of Marfan's syndrome, Moo-Danlos syndrome or familiar aneurysms syndrome      Rory Ladi was comfortable with our recommendations, and their questions were answered to their satisfaction. Thank you for allowing us to participate in the care of this patient. Aortic Aneurysm Instructions were provided to the patient as follows:    1. No lifting more than 50 pounds  2. Maintain a controlled blood pressure with a goal of 120/80. 3. Follow up in Aortic Clinic as recommended with radiology follow up as instructed  4. Report to the ER or call 911 immediately with the following signs / symptoms: sudden onset of back pain, chest pain or shortness of breath. 5. Maintain follow up with cardiologist  6. Patient verbalized understanding of the information discussed and agrees to the treatment plan. The patient recently had a screening colonoscopy in 2022. Therefore GI referral is not indicated at this time.      SIGNATURE: Sandy Willson  DATE: 08/28/23  TIME: 1:09 PM

## 2023-12-07 DIAGNOSIS — F33.41 RECURRENT MAJOR DEPRESSIVE DISORDER, IN PARTIAL REMISSION (HCC): ICD-10-CM

## 2023-12-07 DIAGNOSIS — G43.909 MIGRAINE WITHOUT STATUS MIGRAINOSUS, NOT INTRACTABLE, UNSPECIFIED MIGRAINE TYPE: ICD-10-CM

## 2023-12-07 DIAGNOSIS — R51.9 BILATERAL HEADACHES: Chronic | ICD-10-CM

## 2023-12-07 RX ORDER — SUMATRIPTAN 100 MG/1
100 TABLET, FILM COATED ORAL ONCE AS NEEDED
Qty: 30 TABLET | Refills: 2 | Status: SHIPPED | OUTPATIENT
Start: 2023-12-07

## 2023-12-07 RX ORDER — FLUOXETINE HYDROCHLORIDE 40 MG/1
40 CAPSULE ORAL DAILY
Qty: 30 CAPSULE | Refills: 5 | Status: SHIPPED | OUTPATIENT
Start: 2023-12-07

## 2024-01-13 ENCOUNTER — TELEPHONE (OUTPATIENT)
Dept: OTHER | Facility: OTHER | Age: 54
End: 2024-01-13

## 2024-01-13 NOTE — TELEPHONE ENCOUNTER
Patient is calling regarding cancelling an appointment.    Date/Time: 1/15/2024 / 9:00 am    Patient was rescheduled: YES [] NO [x]    Patient requesting call back to reschedule: YES [x] NO []

## 2024-01-29 DIAGNOSIS — R51.9 BILATERAL HEADACHES: Chronic | ICD-10-CM

## 2024-01-29 DIAGNOSIS — G43.909 MIGRAINE WITHOUT STATUS MIGRAINOSUS, NOT INTRACTABLE, UNSPECIFIED MIGRAINE TYPE: ICD-10-CM

## 2024-01-29 RX ORDER — SUMATRIPTAN 100 MG/1
100 TABLET, FILM COATED ORAL ONCE AS NEEDED
Qty: 30 TABLET | Refills: 2 | Status: SHIPPED | OUTPATIENT
Start: 2024-01-29

## 2024-01-29 NOTE — TELEPHONE ENCOUNTER
SUMAtriptan (IMITREX) 100 mg tablet  1 tablet as needed per day for Migraine      To [facility] in [place]

## 2024-02-02 ENCOUNTER — RA CDI HCC (OUTPATIENT)
Dept: OTHER | Facility: HOSPITAL | Age: 54
End: 2024-02-02

## 2024-02-02 NOTE — PROGRESS NOTES
HCC coding opportunities          Chart Reviewed number of suggestions sent to Provider: 1   F33.41    This is a reminder to address (resolve/update/assess) ALL HCC (risk adjustment) codes as found on active problem list for 2024 as patient scores reset to zero MARU.  Also, just a reminder to please review and assess all other chronic conditions for 2024  Patients Insurance        Commercial Insurance: [employer]

## 2024-02-09 ENCOUNTER — OFFICE VISIT (OUTPATIENT)
Dept: FAMILY MEDICINE CLINIC | Facility: CLINIC | Age: 54
End: 2024-02-09
Payer: COMMERCIAL

## 2024-02-09 VITALS
SYSTOLIC BLOOD PRESSURE: 118 MMHG | BODY MASS INDEX: 31.4 KG/M2 | DIASTOLIC BLOOD PRESSURE: 78 MMHG | HEART RATE: 81 BPM | HEIGHT: 69 IN | OXYGEN SATURATION: 99 % | WEIGHT: 212 LBS

## 2024-02-09 DIAGNOSIS — F33.41 RECURRENT MAJOR DEPRESSIVE DISORDER, IN PARTIAL REMISSION (HCC): Chronic | ICD-10-CM

## 2024-02-09 DIAGNOSIS — Z00.00 WELLNESS EXAMINATION: Primary | ICD-10-CM

## 2024-02-09 DIAGNOSIS — J30.89 NON-SEASONAL ALLERGIC RHINITIS, UNSPECIFIED TRIGGER: ICD-10-CM

## 2024-02-09 DIAGNOSIS — G57.12 NEUROPATHY OF LEFT LATERAL FEMORAL CUTANEOUS NERVE: Chronic | ICD-10-CM

## 2024-02-09 DIAGNOSIS — Q23.1 BICUSPID AORTIC VALVE: ICD-10-CM

## 2024-02-09 DIAGNOSIS — I71.21 ANEURYSM OF ASCENDING AORTA WITHOUT RUPTURE (HCC): ICD-10-CM

## 2024-02-09 DIAGNOSIS — E03.9 ACQUIRED HYPOTHYROIDISM: Chronic | ICD-10-CM

## 2024-02-09 DIAGNOSIS — F10.11 HISTORY OF ALCOHOL ABUSE: ICD-10-CM

## 2024-02-09 PROBLEM — Q23.81 BICUSPID AORTIC VALVE: Status: ACTIVE | Noted: 2024-02-09

## 2024-02-09 PROCEDURE — 99396 PREV VISIT EST AGE 40-64: CPT | Performed by: FAMILY MEDICINE

## 2024-02-09 NOTE — PROGRESS NOTES
ADULT ANNUAL PHYSICAL  [facility] PRIMARY CARE    NAME: Axel Kaminski  AGE: 53 y.o. 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  Continue current regimen.  Note is taking only half a tablet of the Seroquel          Other Visit Diagnoses       Wellness examination    -  Primary            Immunizations and preventive care screenings were discussed with patient today. Appropriate education was printed on patient's after visit summary.        Counseling:  Exercise: the importance of regular exercise/physical activity was discussed. Recommend exercise 3-5 times per week for at least 30 minutes.   Watch starch in diet      Depression Screening and Follow-up Plan: Patient was screened for depression during today's encounter. They screened negative with a PHQ-9 score of 0.        Return in about 6 months (around 2024) for Recheck.  Watch starch in diet     Chief Complaint:     Chief Complaint   Patient presents with    Physical Exam    Eczema      History of Present Illness:     Adult Annual Physical    Patient here for a comprehensive physical exam. The patient reports no problems.    Diet and Physical Activity  Diet/Nutrition: well balanced diet.   Exercise: walking.      Depression Screening  PHQ-2/9 Depression Screening    Little interest or pleasure in doing things: 0 - not at all  Feeling down, depressed, or hopeless: 0 - not at all  Trouble falling or staying asleep, or sleeping too much: 0 - not at all  Feeling tired or having little energy: 0 - not at all  Poor appetite or overeatin - not at all  Feeling bad about yourself - or that you are a failure or have let yourself or your family down: 0 - not at all  Trouble concentrating on things, such as reading the newspaper or watching television: 0 - not at all  Moving or speaking so slowly that other people could have noticed. Or the opposite - being so fidgety or restless that you have been moving around a lot more than usual: 0 - not at all  Thoughts that you would be better off dead, or of hurting yourself in some way: 0 - not at all  PHQ-9 Score: 0  PHQ-9 Interpretation: No or Minimal depression       General Health  Sleep: sleeps well.   Hearing: normal - bilateral.  Vision: no vision problems.   Dental: regular dental visits.        Health  Symptoms include: none    Advanced Care Planning  Do you have an advanced directive? no  Do you have a durable medical power of ? no     Review of Systems:     Review of Systems   Constitutional:  Negative for activity change, appetite change, chills, fatigue, fever and unexpected weight change.   HENT:  Negative for congestion, dental problem, hearing loss, rhinorrhea and trouble swallowing.    Eyes:  Negative for visual disturbance.   Respiratory:  Negative for apnea, cough, chest tightness and shortness of breath.    Cardiovascular:  Negative for chest pain, palpitations and leg swelling.   Gastrointestinal:  Negative for abdominal distention, abdominal pain, constipation and diarrhea.   Endocrine:  Negative for polyuria (Nocturia x 0-1).   Genitourinary:  Negative for difficulty urinating.   Musculoskeletal:  Negative for arthralgias and myalgias.   Skin:  Negative for rash.   Allergic/Immunologic: Positive for environmental allergies.   Neurological:  Negative for dizziness, weakness, light-headedness, numbness and headaches.   Hematological:  Negative for adenopathy.   Psychiatric/Behavioral:  Negative for agitation and sleep disturbance. The patient is not nervous/anxious.       Past Medical History:     Past Medical History:   Diagnosis Date    Allergic rhinitis     Depression     Erectile dysfunction     Former tobacco use     History of alcohol use     2 years of abuse, no liver dysfunction    History of COVID-19     History of herniated intervertebral disc     lumbar,affecting left side    History of substance abuse (HCC)     opiate use, no IVDA, no HIV/hepatitis, prior suboxone use    Hypothyroidism     Hypothyroidism     Migraines     Neuropathy     Obesity (BMI 30.0-34.9)     RLS (restless legs syndrome)     Thoracic aortic aneurysm (TAA) (HCC)     48mm      Past Surgical History:     Past Surgical History:   Procedure Laterality Date    DENTAL SURGERY      extraction requiring anesthesia    NASAL POLYP EXCISION      NASAL SEPTUM SURGERY      TONSILLECTOMY        Family History:     Family History   Problem Relation Age of Onset    Breast cancer Mother     Kidney cancer Mother     Breast cancer Family     Kidney cancer Family       Social History:     Social History     Socioeconomic History    Marital status: /Civil Union     Spouse name: None    Number of children: None    Years of education: None    Highest education level: None   Occupational History    None   Tobacco Use    Smoking status: Former     Current packs/day: 0.00     Average packs/day: 1 pack/day for 25.0 years (25.0 total pack years)     Types: Cigarettes     Start date: 1/27/1990     Quit date: 1/27/2015     Years since  "quittin.0    Smokeless tobacco: Never   Vaping Use    Vaping status: Never Used   Substance and Sexual Activity    Alcohol use: Yes     Alcohol/week: 5.0 standard drinks of alcohol     Types: 5 Shots of liquor per week     Comment: 5 shots daily    Drug use: Never    Sexual activity: None   Other Topics Concern    None   Social History Narrative    Home is not smoke-free      Social Determinants of Health     Financial Resource Strain: Not on file   Food Insecurity: Not on file   Transportation Needs: Not on file   Physical Activity: Not on file   Stress: Not on file   Social Connections: Not on file   Intimate Partner Violence: Not on file   Housing Stability: Not on file      Current Medications:     Current Outpatient Medications   Medication Sig Dispense Refill    FLUoxetine (PROzac) 40 MG capsule Take 1 capsule (40 mg total) by mouth daily 30 capsule 5    levothyroxine 125 mcg tablet TAKE 1 TAB BY MOUTH DAILY IN THE EARLY MORNING 30 tablet 5    losartan (COZAAR) 25 mg tablet Take 25 mg by mouth daily      metoprolol succinate (TOPROL-XL) 25 mg 24 hr tablet Take 25 mg by mouth daily      QUEtiapine (SEROquel) 50 mg tablet TAKE 1 TABLET BY MOUTH EACH EVENING AT BEDTIME. (Patient taking differently: Take 25 mg by mouth daily Has half a pill of 50mg so a total of 25mg) 30 tablet 5    SUMAtriptan (IMITREX) 100 mg tablet Take 1 tablet (100 mg total) by mouth once as needed for migraine 30 tablet 2    valACYclovir (VALTREX) 1,000 mg tablet Take 1 tablet (1,000 mg total) by mouth 2 (two) times a day for 1 day As needed for cold sore 2 tablet 3     No current facility-administered medications for this visit.      Allergies:     Allergies   Allergen Reactions    Pollen Extract Allergic Rhinitis      Physical Exam:     Blood Pressure 118/78 (BP Location: Left arm, Patient Position: Sitting, Cuff Size: Standard)   Pulse 81   Height 5' 9\" (1.753 m)   Weight 96.2 kg (212 lb)   Oxygen Saturation 99%   Body Mass Index " 31.31 kg/m²     Physical Exam  Vitals and nursing note reviewed.   Constitutional:       Appearance: Normal appearance.   HENT:      Head: Normocephalic.      Right Ear: Hearing, tympanic membrane, ear canal and external ear normal.      Left Ear: Hearing, tympanic membrane, ear canal and external ear normal.      Mouth/Throat:      Mouth: Mucous membranes are moist.      Dentition: Normal dentition.   Eyes:      Extraocular Movements: Extraocular movements intact.      Conjunctiva/sclera: Conjunctivae normal.      Pupils: Pupils are equal, round, and reactive to light.   Neck:      Vascular: No carotid bruit.   Cardiovascular:      Rate and Rhythm: Normal rate and regular rhythm.      Pulses:           Dorsalis pedis pulses are 1+ on the right side and 1+ on the left side.        Posterior tibial pulses are 2+ on the right side and 2+ on the left side.      Heart sounds: Normal heart sounds. No murmur heard.  Pulmonary:      Effort: Pulmonary effort is normal.      Breath sounds: Normal breath sounds.   Abdominal:      General: Abdomen is flat. There is no distension.      Palpations: Abdomen is soft. There is no mass.      Tenderness: There is no abdominal tenderness.      Hernia: There is no hernia in the left inguinal area or right inguinal area.   Genitourinary:     Penis: Normal and circumcised.       Testes: Normal.   Musculoskeletal:         General: No swelling.      Cervical back: Normal range of motion and neck supple. No muscular tenderness.      Right lower leg: No edema.      Left lower leg: No edema.      Right foot: No deformity.      Left foot: No deformity.   Feet:      Right foot:      Protective Sensation: 8 sites tested.  8 sites sensed.      Skin integrity: Skin integrity normal.      Left foot:      Protective Sensation: 8 sites tested.  8 sites sensed.   Lymphadenopathy:      Cervical: No cervical adenopathy.   Skin:     General: Skin is warm and dry.      Findings: No rash.   Neurological:       General: No focal deficit present.      Mental Status: He is alert and oriented to person, place, and time.      Cranial Nerves: No cranial nerve deficit.      Sensory: No sensory deficit.      Motor: No weakness.      Coordination: Coordination normal.      Gait: Gait normal.      Deep Tendon Reflexes: Reflexes normal.   Psychiatric:         Mood and Affect: Mood normal.          Oli Rivera MD  [facility]

## 2024-02-09 NOTE — PATIENT INSTRUCTIONS
Overall exam today looks good.  Will check renal panel for history of neuropathy and thyroid levels for history of hypothyroidism.  Has had flu shot.  Try to get some daily physical activity and watch the starch in the diet.  Continue on current meds

## 2024-02-10 PROBLEM — F10.11 HISTORY OF ALCOHOL ABUSE: Status: ACTIVE | Noted: 2021-08-13

## 2024-02-10 NOTE — ASSESSMENT & PLAN NOTE
Overall seems to be doing well.  Continue current regimen.  Note is taking only half a tablet of the Seroquel

## 2024-03-04 DIAGNOSIS — E03.9 ACQUIRED HYPOTHYROIDISM: ICD-10-CM

## 2024-03-04 RX ORDER — LEVOTHYROXINE SODIUM 0.12 MG/1
125 TABLET ORAL
Qty: 30 TABLET | Refills: 6 | Status: SHIPPED | OUTPATIENT
Start: 2024-03-04

## 2024-07-22 ENCOUNTER — TRANSCRIBE ORDERS (OUTPATIENT)
Dept: CARDIAC SURGERY | Facility: CLINIC | Age: 54
End: 2024-07-22

## 2024-07-22 DIAGNOSIS — I71.21 ANEURYSM OF ASCENDING AORTA WITHOUT RUPTURE (HCC): Primary | ICD-10-CM

## 2024-08-02 ENCOUNTER — RA CDI HCC (OUTPATIENT)
Dept: OTHER | Facility: HOSPITAL | Age: 54
End: 2024-08-02

## 2024-08-09 ENCOUNTER — OFFICE VISIT (OUTPATIENT)
Dept: FAMILY MEDICINE CLINIC | Facility: CLINIC | Age: 54
End: 2024-08-09
Payer: COMMERCIAL

## 2024-08-09 VITALS
WEIGHT: 200.8 LBS | BODY MASS INDEX: 29.74 KG/M2 | HEIGHT: 69 IN | DIASTOLIC BLOOD PRESSURE: 70 MMHG | SYSTOLIC BLOOD PRESSURE: 110 MMHG

## 2024-08-09 DIAGNOSIS — L30.9 DERMATITIS: ICD-10-CM

## 2024-08-09 DIAGNOSIS — G43.909 MIGRAINE WITHOUT STATUS MIGRAINOSUS, NOT INTRACTABLE, UNSPECIFIED MIGRAINE TYPE: ICD-10-CM

## 2024-08-09 DIAGNOSIS — R51.9 BILATERAL HEADACHES: Primary | Chronic | ICD-10-CM

## 2024-08-09 DIAGNOSIS — F33.41 RECURRENT MAJOR DEPRESSIVE DISORDER, IN PARTIAL REMISSION (HCC): ICD-10-CM

## 2024-08-09 DIAGNOSIS — E03.9 ACQUIRED HYPOTHYROIDISM: Chronic | ICD-10-CM

## 2024-08-09 PROCEDURE — 99213 OFFICE O/P EST LOW 20 MIN: CPT | Performed by: FAMILY MEDICINE

## 2024-08-09 RX ORDER — SUMATRIPTAN 100 MG/1
100 TABLET, FILM COATED ORAL ONCE AS NEEDED
Qty: 9 TABLET | Refills: 2 | Status: SHIPPED | OUTPATIENT
Start: 2024-08-09

## 2024-08-09 RX ORDER — FLUOXETINE HYDROCHLORIDE 40 MG/1
40 CAPSULE ORAL DAILY
Qty: 30 CAPSULE | Refills: 5 | Status: SHIPPED | OUTPATIENT
Start: 2024-08-09

## 2024-08-09 RX ORDER — BETAMETHASONE DIPROPIONATE 0.5 MG/G
CREAM TOPICAL 2 TIMES DAILY
Qty: 15 G | Refills: 2 | Status: SHIPPED | OUTPATIENT
Start: 2024-08-09

## 2024-08-09 NOTE — ASSESSMENT & PLAN NOTE
I feel is having mild psoriasis at the base of the scalp.  The shampoo seems to be doing well with the rest of the scalp.  May use Diprolene cream twice a day as needed for the rash

## 2024-08-09 NOTE — PROGRESS NOTES
Ambulatory Visit  Name: Axel Kaminski      : 1970      MRN: [id number]  Encounter Provider: Oli Rivera MD  Encounter Date: 2024   Encounter department: [facility] PRIMARY CARE    Assessment & Plan   1. Bilateral headaches  -     SUMAtriptan (IMITREX) 100 mg tablet; Take 1 tablet (100 mg total) by mouth once as needed for migraine  2. Migraine without status migrainosus, not intractable, unspecified migraine type  -     SUMAtriptan (IMITREX) 100 mg tablet; Take 1 tablet (100 mg total) by mouth once as needed for migraine  3. Recurrent major depressive disorder, in partial remission (HCC)  Assessment & Plan:  Seems to be doing well, continue current regimen  Orders:  -     FLUoxetine (PROzac) 40 MG capsule; Take 1 capsule (40 mg total) by mouth daily  4. Dermatitis  Assessment & Plan:  I feel is having mild psoriasis at the base of the scalp.  The shampoo seems to be doing well with the rest of the scalp.  May use Diprolene cream twice a day as needed for the rash  Orders:  -     betamethasone dipropionate (DIPROSONE) 0.05 % cream; Apply topically 2 (two) times a day  5. Acquired hypothyroidism  Assessment & Plan:  Overall seems stable, continue current regimen         History of Present Illness     Patient has history of migraines, substance abuse disorder, depression, sleep disorder, thoracic aortic aneurysm and hypothyroidism.  Has been having recurrent problems with rash at the base of the scalp on the neck and the back that is very itchy.  Has been substance free now for 3 years      Review of Systems   Skin:  Positive for rash (That is itchy on the back of the neck at the bottom of the hairline).     Past Medical History:   Diagnosis Date    Allergic rhinitis     Depression     Erectile dysfunction     Former tobacco use     History of alcohol use     2 years of abuse, no liver dysfunction    History of COVID-19     History of herniated intervertebral disc      lumbar,affecting left side    History of substance abuse (HCC)     opiate use, no IVDA, no HIV/hepatitis, prior suboxone use    Hypothyroidism     Hypothyroidism     Migraines     Neuropathy     Obesity (BMI 30.0-34.9)     RLS (restless legs syndrome)     Thoracic aortic aneurysm (TAA) (HCC)     48mm     Past Surgical History:   Procedure Laterality Date    DENTAL SURGERY      extraction requiring anesthesia    NASAL POLYP EXCISION      NASAL SEPTUM SURGERY      TONSILLECTOMY       Family History   Problem Relation Age of Onset    Breast cancer Mother     Kidney cancer Mother     Breast cancer Family     Kidney cancer Family      Social History     Tobacco Use    Smoking status: Former     Current packs/day: 0.00     Average packs/day: 1 pack/day for 25.0 years (25.0 total pack years)     Types: Cigarettes     Start date: 1990     Quit date: 2015     Years since quittin.5    Smokeless tobacco: Never   Vaping Use    Vaping status: Never Used   Substance and Sexual Activity    Alcohol use: Yes     Alcohol/week: 5.0 standard drinks of alcohol     Types: 5 Shots of liquor per week     Comment: 5 shots daily    Drug use: Never    Sexual activity: Not on file     Current Outpatient Medications on File Prior to Visit   Medication Sig    levothyroxine 125 mcg tablet TAKE 1 TABLET BY MOUTH DAILY IN THE EARLY MORNING    losartan (COZAAR) 25 mg tablet Take 25 mg by mouth daily    metoprolol succinate (TOPROL-XL) 25 mg 24 hr tablet Take 25 mg by mouth daily    QUEtiapine (SEROquel) 50 mg tablet TAKE 1 TABLET BY MOUTH EACH EVENING AT BEDTIME. (Patient taking differently: Take 25 mg by mouth daily Has half a pill of 50mg so a total of 25mg)    [DISCONTINUED] FLUoxetine (PROzac) 40 MG capsule Take 1 capsule (40 mg total) by mouth daily    [DISCONTINUED] SUMAtriptan (IMITREX) 100 mg tablet Take 1 tablet (100 mg total) by mouth once as needed for migraine    valACYclovir (VALTREX) 1,000 mg tablet Take 1 tablet  "(1,000 mg total) by mouth 2 (two) times a day for 1 day As needed for cold sore     Allergies   Allergen Reactions    Pollen Extract Allergic Rhinitis     Immunization History   Administered Date(s) Administered    COVID-19 MODERNA VACC 0.5 ML IM 03/20/2021, 04/17/2021, 12/14/2021    INFLUENZA 11/07/2007, 10/06/2015, 10/18/2016, 10/10/2017, 10/30/2018, 10/13/2021    Influenza, recombinant, quadrivalent,injectable, preservative free 12/14/2020    Influenza, seasonal, injectable 10/06/2015, 10/18/2016, 10/10/2017, 10/30/2018    Pneumococcal Polysaccharide PPV23 05/10/2012    Tdap 05/10/2012, 05/09/2014    Tuberculin Skin Test-PPD Intradermal 05/16/2005     Objective     Blood Pressure 110/70 (BP Location: Left arm, Patient Position: Sitting, Cuff Size: Standard)   Height 5' 9\" (1.753 m)   Weight 91.1 kg (200 lb 12.8 oz)   Body Mass Index 29.65 kg/m²     Physical Exam    "      General: He is not in acute distress.     Appearance: Normal appearance. He is well-developed.   HENT:      Head: Normocephalic and atraumatic.     Eyes:      Conjunctiva/sclera: Conjunctivae normal.   Neck:      Vascular: No carotid bruit.   Cardiovascular:      Rate and Rhythm: Normal rate and regular rhythm.      Heart sounds: No murmur (Rate is 66) heard.  Pulmonary:      Effort: Pulmonary effort is normal. No respiratory distress.      Breath sounds: Normal breath sounds.   Abdominal:      General: Abdomen is flat.      Palpations: Abdomen is soft. There is no mass.      Tenderness: There is no abdominal tenderness.   Musculoskeletal:         General: No swelling.      Cervical back: Neck supple. No tenderness.      Right lower leg: No edema.      Left lower leg: No edema.   Lymphadenopathy:      Cervical: No cervical adenopathy.   Skin:     General: Skin is warm and dry.      Capillary Refill: Capillary refill takes less than 2 seconds.      Findings: No rash.   Neurological:      Mental Status: He is alert.      Motor: No weakness.      Coordination: Coordination normal.      Gait: Gait normal.      Deep Tendon Reflexes: Reflexes normal.   Psychiatric:         Mood and Affect: Mood normal.

## 2024-08-09 NOTE — PROGRESS NOTES
Ambulatory Visit  Name: Axel Kaminski      : 1970      MRN: [id number]  Encounter Provider: Oli Rivera MD  Encounter Date: 2024   Encounter department: [facility] PRIMARY CARE    Assessment & Plan   1. Need for hepatitis C screening test  2. Screening for HIV (human immunodeficiency virus)  3. Nicotine dependence, cigarettes, in remission         History of Present Illness   {Disappearing Hyperlinks I Encounters * My Last Note * Since Last Visit * History :65913}  HPI  Review of Systems   HENT:  Positive for congestion.    Respiratory:  Negative for chest tightness and shortness of breath.    Cardiovascular:  Negative for chest pain, palpitations and leg swelling.   Gastrointestinal:  Negative for constipation and diarrhea.   Endocrine: Negative for polyuria.   Genitourinary:  Negative for difficulty urinating.   Skin:  Positive for rash.   Allergic/Immunologic: Positive for environmental allergies.   Neurological:  Negative for dizziness, light-headedness and headaches.   Psychiatric/Behavioral:  Negative for sleep disturbance. The patient is not nervous/anxious.      Past Medical History:   Diagnosis Date    Allergic rhinitis     Depression     Erectile dysfunction     Former tobacco use     History of alcohol use     2 years of abuse, no liver dysfunction    History of COVID-19     History of herniated intervertebral disc     lumbar,affecting left side    History of substance abuse (HCC)     opiate use, no IVDA, no HIV/hepatitis, prior suboxone use    Hypothyroidism     Hypothyroidism     Migraines     Neuropathy     Obesity (BMI 30.0-34.9)     RLS (restless legs syndrome)     Thoracic aortic aneurysm (TAA) (HCC)     48mm     Past Surgical History:   Procedure Laterality Date    DENTAL SURGERY      extraction requiring anesthesia    NASAL POLYP EXCISION      NASAL SEPTUM SURGERY      TONSILLECTOMY       Family History   Problem Relation Age of Onset    Breast  cancer Mother     Kidney cancer Mother     Breast cancer Family     Kidney cancer Family      Social History     Tobacco Use    Smoking status: Former     Current packs/day: 0.00     Average packs/day: 1 pack/day for 25.0 years (25.0 total pack years)     Types: Cigarettes     Start date: 1990     Quit date: 2015     Years since quittin.5    Smokeless tobacco: Never   Vaping Use    Vaping status: Never Used   Substance and Sexual Activity    Alcohol use: Yes     Alcohol/week: 5.0 standard drinks of alcohol     Types: 5 Shots of liquor per week     Comment: 5 shots daily    Drug use: Never    Sexual activity: Not on file     Current Outpatient Medications on File Prior to Visit   Medication Sig    FLUoxetine (PROzac) 40 MG capsule Take 1 capsule (40 mg total) by mouth daily    levothyroxine 125 mcg tablet TAKE 1 TABLET BY MOUTH DAILY IN THE EARLY MORNING    losartan (COZAAR) 25 mg tablet Take 25 mg by mouth daily    metoprolol succinate (TOPROL-XL) 25 mg 24 hr tablet Take 25 mg by mouth daily    QUEtiapine (SEROquel) 50 mg tablet TAKE 1 TABLET BY MOUTH EACH EVENING AT BEDTIME. (Patient taking differently: Take 25 mg by mouth daily Has half a pill of 50mg so a total of 25mg)    SUMAtriptan (IMITREX) 100 mg tablet Take 1 tablet (100 mg total) by mouth once as needed for migraine    valACYclovir (VALTREX) 1,000 mg tablet Take 1 tablet (1,000 mg total) by mouth 2 (two) times a day for 1 day As needed for cold sore     Allergies   Allergen Reactions    Pollen Extract Allergic Rhinitis     Immunization History   Administered Date(s) Administered    COVID-19 MODERNA VACC 0.5 ML IM 2021, 2021, 2021    INFLUENZA 2007, 10/06/2015, 10/18/2016, 10/10/2017, 10/30/2018, 10/13/2021    Influenza, recombinant, quadrivalent,injectable, preservative free 2020    Influenza, seasonal, injectable 10/06/2015, 10/18/2016, 10/10/2017, 10/30/2018    Pneumococcal Polysaccharide PPV23 05/10/2012     "Tdap 05/10/2012, 05/09/2014    Tuberculin Skin Test-PPD Intradermal 05/16/2005     Objective   {Disappearing Hyperlinks   Review Vitals * Enter New Vitals * Results Review * Labs * Imaging * Cardiology * Procedures * Lung Cancer Screening :49567}  Blood Pressure 110/70 (BP Location: Left arm, Patient Position: Sitting, Cuff Size: Standard)   Height 5' 9\" (1.753 m)   Weight 91.1 kg (200 lb 12.8 oz)   Body Mass Index 29.65 kg/m²     Physical Exam  {Administrative / Billing Section (Optional):61073}  "

## 2024-08-09 NOTE — PATIENT INSTRUCTIONS
Discussed the problem with the rash in the back which I feel is mild psoriasis.  The rest of the scalp looks good and may use the Diprolene cream twice a day at bedtime and after showering if needed which should help with the itching.  Avoid scratching.  Will continue other meds as is

## 2024-09-09 ENCOUNTER — HOSPITAL ENCOUNTER (OUTPATIENT)
Dept: NON INVASIVE DIAGNOSTICS | Facility: HOSPITAL | Age: 54
Discharge: HOME | End: 2024-09-09
Payer: COMMERCIAL

## 2024-09-09 VITALS
WEIGHT: 200 LBS | HEART RATE: 66 BPM | HEIGHT: 69 IN | BODY MASS INDEX: 29.62 KG/M2 | SYSTOLIC BLOOD PRESSURE: 110 MMHG | DIASTOLIC BLOOD PRESSURE: 70 MMHG

## 2024-09-09 DIAGNOSIS — I71.21 ANEURYSM OF THE ASCENDING AORTA, WITHOUT RUPTURE (HCC): ICD-10-CM

## 2024-09-09 DIAGNOSIS — R06.83 SNORING: ICD-10-CM

## 2024-09-09 DIAGNOSIS — Q23.1 CONGENITAL INSUFFICIENCY OF AORTIC VALVE: ICD-10-CM

## 2024-09-09 LAB
AORTIC ROOT: 3.4 CM
APICAL FOUR CHAMBER EJECTION FRACTION: 58 %
ASCENDING AORTA: 4 CM
BSA FOR ECHO PROCEDURE: 2.07 M2
E WAVE DECELERATION TIME: 207 MS
E/A RATIO: 1.25
FRACTIONAL SHORTENING: 31 (ref 28–44)
INTERVENTRICULAR SEPTUM IN DIASTOLE (PARASTERNAL SHORT AXIS VIEW): 1.3 CM
INTERVENTRICULAR SEPTUM: 1.3 CM (ref 0.6–1.1)
LAAS-AP2: 21.3 CM2
LAAS-AP4: 19.4 CM2
LEFT ATRIUM SIZE: 3.8 CM
LEFT ATRIUM VOLUME (MOD BIPLANE): 59 ML
LEFT ATRIUM VOLUME INDEX (MOD BIPLANE): 28.5 ML/M2
LEFT INTERNAL DIMENSION IN SYSTOLE: 2.7 CM (ref 2.1–4)
LEFT VENTRICULAR INTERNAL DIMENSION IN DIASTOLE: 3.9 CM (ref 3.5–6)
LEFT VENTRICULAR POSTERIOR WALL IN END DIASTOLE: 1.3 CM
LEFT VENTRICULAR STROKE VOLUME: 36 ML
LVSV (TEICH): 36 ML
MV E'TISSUE VEL-SEP: 13 CM/S
MV PEAK A VEL: 0.73 M/S
MV PEAK E VEL: 91 CM/S
MV STENOSIS PRESSURE HALF TIME: 60 MS
MV VALVE AREA P 1/2 METHOD: 3.67
RIGHT ATRIUM AREA SYSTOLE A4C: 19.9 CM2
RIGHT VENTRICLE ID DIMENSION: 2.9 CM
SL CV LEFT ATRIUM LENGTH A2C: 5.6 CM
SL CV LV EF: 65
SL CV PED ECHO LEFT VENTRICLE DIASTOLIC VOLUME (MOD BIPLANE) 2D: 64 ML
SL CV PED ECHO LEFT VENTRICLE SYSTOLIC VOLUME (MOD BIPLANE) 2D: 28 ML
TR MAX PG: 32 MMHG
TR PEAK VELOCITY: 2.8 M/S
TRICUSPID ANNULAR PLANE SYSTOLIC EXCURSION: 2.1 CM
TRICUSPID VALVE PEAK REGURGITATION VELOCITY: 2.84 M/S

## 2024-09-09 PROCEDURE — 93306 TTE W/DOPPLER COMPLETE: CPT | Performed by: INTERNAL MEDICINE

## 2024-09-09 PROCEDURE — 93306 TTE W/DOPPLER COMPLETE: CPT

## 2024-09-16 ENCOUNTER — HOSPITAL ENCOUNTER (OUTPATIENT)
Dept: CT IMAGING | Facility: HOSPITAL | Age: 54
Discharge: HOME | End: 2024-09-16
Payer: COMMERCIAL

## 2024-09-16 DIAGNOSIS — I71.21 ANEURYSM OF ASCENDING AORTA WITHOUT RUPTURE (HCC): ICD-10-CM

## 2024-09-16 PROCEDURE — 71250 CT THORAX DX C-: CPT

## 2024-09-17 ENCOUNTER — OFFICE VISIT (OUTPATIENT)
Dept: URGENT CARE | Facility: CLINIC | Age: 54
End: 2024-09-17
Payer: COMMERCIAL

## 2024-09-17 VITALS
BODY MASS INDEX: 28.49 KG/M2 | WEIGHT: 199 LBS | SYSTOLIC BLOOD PRESSURE: 138 MMHG | HEART RATE: 84 BPM | HEIGHT: 70 IN | OXYGEN SATURATION: 99 % | RESPIRATION RATE: 16 BRPM | DIASTOLIC BLOOD PRESSURE: 82 MMHG | TEMPERATURE: 98.2 F

## 2024-09-17 DIAGNOSIS — B34.9 VIRAL ILLNESS: Primary | ICD-10-CM

## 2024-09-17 DIAGNOSIS — R05.1 ACUTE COUGH: ICD-10-CM

## 2024-09-17 LAB
SARS-COV-2 AG UPPER RESP QL IA: NEGATIVE
VALID CONTROL: NORMAL

## 2024-09-17 PROCEDURE — 87811 SARS-COV-2 COVID19 W/OPTIC: CPT

## 2024-09-17 PROCEDURE — G0382 LEV 3 HOSP TYPE B ED VISIT: HCPCS

## 2024-09-17 PROCEDURE — S9083 URGENT CARE CENTER GLOBAL: HCPCS

## 2024-09-17 NOTE — PROGRESS NOTES
[facility]        NAME: Axel Kaminski is a 53 y.o. male  : 1970    MRN: [id number]  DATE: 2024  TIME: 4:42 PM    Assessment and Plan   Viral illness [B34.9]  1. Viral illness        2. Acute cough  Poct Covid 19 Rapid Antigen Test        Rapid covid: neg     Given symptoms starting yesterday and no evidence of bacterial etiology, most likely viral illness.  Advised to continue over-the-counter conservative measures.  Advised to follow-up with PCP in 3 to 5 days or if anything worsens proceed ER.  Patient verbalized understanding.    Patient Instructions     OTC medication as needed for symptoms   Plenty of fluids  Can use honey   Cool mist humidifier   Warm gargle with salt water for sore throat   Use Tylenol/ibuprofen as needed for fever or pain    Follow up with PCP in 3-5 days.  Proceed to  ER if symptoms worsen.    If tests are performed, our office will contact you with results only if changes need to made to the care plan discussed with you at the visit. You can review your full results on [facility].    Chief Complaint     Chief Complaint   Patient presents with    Cold Like Symptoms     Cough, congestion, achy and flu like s/s for 1 day         History of Present Illness       Cough  This is a new problem. The current episode started yesterday. The cough is Non-productive. Associated symptoms include myalgias. Pertinent negatives include no chest pain, chills, ear pain, eye redness, fever, headaches, postnasal drip, rhinorrhea, sore throat, shortness of breath or wheezing.       Review of Systems   Review of Systems   Constitutional:  Negative for chills, fatigue and fever.   HENT:  Positive for congestion. Negative for ear pain, postnasal drip, rhinorrhea, sinus pressure, sneezing, sore throat and tinnitus.    Eyes:  Negative for photophobia, redness and itching.   Respiratory:  Positive for cough. Negative for chest tightness, shortness of breath and  wheezing.    Cardiovascular:  Negative for chest pain.   Musculoskeletal:  Positive for myalgias.   Skin:  Negative for color change and pallor.   Neurological:  Negative for dizziness, light-headedness and headaches.   Psychiatric/Behavioral:  Negative for confusion.          Current Medications       Current Outpatient Medications:     betamethasone dipropionate (DIPROSONE) 0.05 % cream, Apply topically 2 (two) times a day, Disp: 15 g, Rfl: 2    FLUoxetine (PROzac) 40 MG capsule, Take 1 capsule (40 mg total) by mouth daily, Disp: 30 capsule, Rfl: 5    levothyroxine 125 mcg tablet, TAKE 1 TABLET BY MOUTH DAILY IN THE EARLY MORNING, Disp: 30 tablet, Rfl: 6    losartan (COZAAR) 25 mg tablet, Take 25 mg by mouth daily, Disp: , Rfl:     metoprolol succinate (TOPROL-XL) 25 mg 24 hr tablet, Take 25 mg by mouth daily, Disp: , Rfl:     SUMAtriptan (IMITREX) 100 mg tablet, Take 1 tablet (100 mg total) by mouth once as needed for migraine, Disp: 9 tablet, Rfl: 2    QUEtiapine (SEROquel) 50 mg tablet, TAKE 1 TABLET BY MOUTH EACH EVENING AT BEDTIME. (Patient taking differently: Take 25 mg by mouth daily Has half a pill of 50mg so a total of 25mg), Disp: 30 tablet, Rfl: 5    Current Allergies     Allergies as of 09/17/2024 - Reviewed 09/17/2024   Allergen Reaction Noted    Pollen extract Allergic Rhinitis 05/30/2023            The following portions of the patient's history were reviewed and updated as appropriate: allergies, current medications, past family history, past medical history, past social history, past surgical history and problem list.     Past Medical History:   Diagnosis Date    Allergic rhinitis     Depression     Erectile dysfunction     Former tobacco use     History of alcohol use     2 years of abuse, no liver dysfunction    History of COVID-19     History of herniated intervertebral disc     lumbar,affecting left side    History of substance abuse (HCC)     opiate use, no IVDA, no HIV/hepatitis, prior  "suboxone use    Hypothyroidism     Hypothyroidism     Migraines     Neuropathy     Obesity (BMI 30.0-34.9)     RLS (restless legs syndrome)     Thoracic aortic aneurysm (TAA) (HCC)     48mm       Past Surgical History:   Procedure Laterality Date    DENTAL SURGERY      extraction requiring anesthesia    NASAL POLYP EXCISION      NASAL SEPTUM SURGERY      TONSILLECTOMY         Family History   Problem Relation Age of Onset    Cancer Mother     Breast cancer Mother     Kidney cancer Mother     Breast cancer Family     Kidney cancer Family          Medications have been verified.        Objective   /82   Pulse 84   Temp 98.2 °F (36.8 °C)   Resp 16   Ht 5' 10\" (1.778 m)   Wt 90.3 kg (199 lb)   SpO2 99%   BMI 28.55 kg/m²        Physical Exam     Physical Exam  Constitutional:       Appearance: Normal appearance.   HENT:      Head: Normocephalic.      Right Ear: Tympanic membrane and external ear normal.      Left Ear: Tympanic membrane and external ear normal.      Mouth/Throat:      Mouth: Mucous membranes are moist.      Pharynx: Oropharynx is clear.   Eyes:      Extraocular Movements: Extraocular movements intact.      Conjunctiva/sclera: Conjunctivae normal.      Pupils: Pupils are equal, round, and reactive to light.   Cardiovascular:      Rate and Rhythm: Normal rate and regular rhythm.      Pulses: Normal pulses.      Heart sounds: Normal heart sounds.   Pulmonary:      Effort: Pulmonary effort is normal. No respiratory distress.      Breath sounds: Normal breath sounds. No stridor. No wheezing, rhonchi or rales.   Musculoskeletal:      Cervical back: No tenderness.   Lymphadenopathy:      Cervical: No cervical adenopathy.   Skin:     General: Skin is warm and dry.   Neurological:      General: No focal deficit present.      Mental Status: He is alert and oriented to person, place, and time. Mental status is at baseline.   Psychiatric:         Mood and Affect: Mood normal.         Behavior: Behavior " normal.         Thought Content: Thought content normal.         Judgment: Judgment normal.

## 2024-09-17 NOTE — PATIENT INSTRUCTIONS
OTC medication as needed for symptoms   Plenty of fluids  Can use honey   Cool mist humidifier   Warm gargle with salt water for sore throat   Use Tylenol/ibuprofen as needed for fever or pain    Follow up with PCP in 3-5 days.  Proceed to  ER if symptoms worsen.    If tests are performed, our office will contact you with results only if changes need to made to the care plan discussed with you at the visit. You can review your full results on [facility].

## 2024-10-03 ENCOUNTER — TELEPHONE (OUTPATIENT)
Dept: CARDIAC SURGERY | Facility: CLINIC | Age: 54
End: 2024-10-03

## 2024-10-03 NOTE — TELEPHONE ENCOUNTER
Provider out of the office, called patient and left a message to reschedule. Please transfer call to the office if patient calls back. Thank you!

## 2024-11-25 DIAGNOSIS — B00.1 COLD SORE: Primary | ICD-10-CM

## 2024-11-26 RX ORDER — VALACYCLOVIR HYDROCHLORIDE 1 G/1
TABLET, FILM COATED ORAL
Qty: 2 TABLET | Refills: 3 | Status: SHIPPED | OUTPATIENT
Start: 2024-11-26 | End: 2024-12-02

## 2024-12-02 ENCOUNTER — OFFICE VISIT (OUTPATIENT)
Dept: CARDIAC SURGERY | Facility: CLINIC | Age: 54
End: 2024-12-02
Payer: COMMERCIAL

## 2024-12-02 VITALS
WEIGHT: 214.7 LBS | SYSTOLIC BLOOD PRESSURE: 127 MMHG | BODY MASS INDEX: 31.8 KG/M2 | HEART RATE: 77 BPM | HEIGHT: 69 IN | OXYGEN SATURATION: 97 % | DIASTOLIC BLOOD PRESSURE: 80 MMHG

## 2024-12-02 DIAGNOSIS — I71.21 ANEURYSM OF ASCENDING AORTA WITHOUT RUPTURE (HCC): Primary | ICD-10-CM

## 2024-12-02 PROCEDURE — 99214 OFFICE O/P EST MOD 30 MIN: CPT | Performed by: THORACIC SURGERY (CARDIOTHORACIC VASCULAR SURGERY)

## 2024-12-02 NOTE — PROGRESS NOTES
Aortic Surveillance - Cardiac Surgery   Axel Kaminski 54 y.o. male MRN: [id number]    History of Present Illness: Axel Kaminski is a 54 y.o. year old male who presents today for ongoing surveillance of previously identified ascending aortic aneurysm.  They were most recently evaluated in our office with CT imaging in August 2023. His aneurysm at that time measured 4.6 cm. There have been no significant changes to his medical or surgical history in the last year, per patient.     Patient had a repeat CT scan on 9/16/24 that demonstrates ascending aortic diameter at 4.6 cm. An echo obtained on 9/9/24 demonstrates normal EF of 65%, congenital bicuspid valve with mild AI, normal sized aortic root. Patient follows with his PCP and with Kindra Garcia PA-C of Cardiology at [facility]. His prior visit notes were reviewed. He is tolerating his medications as prescribed - Losartan and metoprolol.  Upon interview, patient denies chest pain, new or worsening back pain, lightheadedness, dizziness, LE edema, headaches, blurry vision, numbness/tingling/paresthesias. He is a former smoker (quit in 2015), does not use drugs or alcohol (clean now for over 3 years). He is accompanied by his wife today.         Past Medical History:  Past Medical History:   Diagnosis Date    Allergic rhinitis     Depression     Erectile dysfunction     Former tobacco use     History of alcohol use     2 years of abuse, no liver dysfunction    History of COVID-19     History of herniated intervertebral disc     lumbar,affecting left side    History of substance abuse (HCC)     opiate use, no IVDA, no HIV/hepatitis, prior suboxone use    Hypothyroidism     Hypothyroidism     Migraines     Neuropathy     Obesity (BMI 30.0-34.9)     RLS (restless legs syndrome)     Thoracic aortic aneurysm (TAA) (HCC)     48mm         Past Surgical History:   Past Surgical History:   Procedure Laterality Date    DENTAL SURGERY      extraction requiring  anesthesia    NASAL POLYP EXCISION      NASAL SEPTUM SURGERY      TONSILLECTOMY           Family History:  Family History   Problem Relation Age of Onset    Cancer Mother     Breast cancer Mother     Kidney cancer Mother     Breast cancer Family     Kidney cancer Family          Social History:    Social History     Substance and Sexual Activity   Alcohol Use Not Currently    Alcohol/week: 5.0 standard drinks of alcohol    Types: 5 Shots of liquor per week    Comment: 5 shots daily     Social History     Substance and Sexual Activity   Drug Use Never     Social History     Tobacco Use   Smoking Status Former    Current packs/day: 0.00    Average packs/day: 1 pack/day for 25.0 years (25.0 ttl pk-yrs)    Types: Cigarettes    Start date: 1990    Quit date: 2015    Years since quittin.8   Smokeless Tobacco Never       Home Medications:   Prior to Admission medications    Medication Sig Start Date End Date Taking? Authorizing Provider   betamethasone dipropionate (DIPROSONE) 0.05 % cream Apply topically 2 (two) times a day 24   Oli Rivera MD   FLUoxetine (PROzac) 40 MG capsule Take 1 capsule (40 mg total) by mouth daily 24   Oli Rivera MD   levothyroxine 125 mcg tablet TAKE 1 TABLET BY MOUTH DAILY IN THE EARLY MORNING 3/4/24   Oli Rivera MD   losartan (COZAAR) 25 mg tablet Take 25 mg by mouth daily 24   Historical Provider, MD   metoprolol succinate (TOPROL-XL) 25 mg 24 hr tablet Take 25 mg by mouth daily 23   Historical Provider, MD   QUEtiapine (SEROquel) 50 mg tablet TAKE 1 TABLET BY MOUTH EACH EVENING AT BEDTIME.  Patient taking differently: Take 25 mg by mouth daily Has half a pill of 50mg so a total of 25mg 4/3/23   Oli Rivera MD   SUMAtriptan (IMITREX) 100 mg tablet Take 1 tablet (100 mg total) by mouth once as needed for migraine 24   Oli Rivera MD   valACYclovir (VALTREX) 1,000 mg tablet TAKE 1 TABLET BY MOUTH 2 TINES A DAY  "TIMES 1 DAY AS NEEDED TO TREAT COLD SORE. 11/26/24 11/27/24  Oli Rivera MD       Allergies:  Allergies   Allergen Reactions    Pollen Extract Allergic Rhinitis       Review of Systems:     Review of Systems   Constitutional:  Negative for chills and fever.   HENT:  Negative for ear pain and sore throat.    Eyes:  Negative for pain and visual disturbance.   Respiratory:  Negative for cough and shortness of breath.    Cardiovascular:  Negative for chest pain and palpitations.   Gastrointestinal:  Negative for abdominal pain and vomiting.   Genitourinary:  Negative for dysuria and hematuria.   Musculoskeletal:  Negative for arthralgias and back pain.   Skin:  Negative for color change and rash.   Neurological:  Negative for seizures and syncope.   All other systems reviewed and are negative.      Vital Signs:     Vitals:    12/02/24 1437 12/02/24 1441   BP: 124/79 127/80   BP Location: Right arm Left arm   Patient Position: Sitting Sitting   Cuff Size: Large Large   Pulse: 77    SpO2: 97%    Weight: 97.4 kg (214 lb 11.2 oz)    Height: 5' 9\" (1.753 m)        Physical Exam:     Physical Exam  Vitals reviewed.   Constitutional:       Appearance: Normal appearance.   HENT:      Head: Normocephalic and atraumatic.   Eyes:      Pupils: Pupils are equal, round, and reactive to light.   Neck:      Vascular: No carotid bruit or JVD.   Cardiovascular:      Rate and Rhythm: Normal rate and regular rhythm.      Pulses:           Carotid pulses are 2+ on the right side and 2+ on the left side.       Radial pulses are 2+ on the right side and 2+ on the left side.        Dorsalis pedis pulses are 2+ on the right side and 2+ on the left side.      Heart sounds: Normal heart sounds. No murmur heard.     No friction rub. No gallop.   Pulmonary:      Effort: Pulmonary effort is normal.      Breath sounds: Normal breath sounds. No wheezing, rhonchi or rales.   Abdominal:      Palpations: Abdomen is soft.      Tenderness: There " "is no abdominal tenderness.   Musculoskeletal:      Cervical back: Normal range of motion.   Skin:     General: Skin is warm and dry.      Capillary Refill: Capillary refill takes less than 2 seconds.   Neurological:      General: No focal deficit present.      Mental Status: He is alert.      Sensory: Sensation is intact.   Psychiatric:         Attention and Perception: Attention normal.         Mood and Affect: Mood normal.         Behavior: Behavior normal. Behavior is cooperative.         Lab Results:               Invalid input(s): \"LABGLOM\"      No results found for: \"HGBA1C\"  No results found for: \"CKTOTAL\", \"CKMB\", \"CKMBINDEX\", \"TROPONINI\"    Imaging Studies:     CT Chest: 9/16/24  IMPRESSION:     Stable 4.6 cm ascending thoracic aortic aneurysm.    Echocardiogram: 9/9/24  Left Ventricle Left ventricular cavity size is normal. Wall thickness is mildly increased. The left ventricular ejection fraction is 65%. Systolic function is normal. Wall motion is normal. Diastolic function is normal.   Right Ventricle Right ventricular cavity size is normal. Systolic function is normal. Wall thickness is normal.   Left Atrium The atrium is normal in size.   Right Atrium The atrium is normal in size.   Aortic Valve The aortic valve is congenitally bicuspid with superior inferior orientation. The leaflets are not thickened. The leaflets are not calcified. The leaflets exhibit normal mobility. There is mild regurgitation. The aortic valve has no significant stenosis.   Mitral Valve Mitral valve structure is normal.  There is mild regurgitation with a centrally directed jet. There is no evidence of stenosis.   Tricuspid Valve Tricuspid valve structure is normal. There is mild regurgitation.  RVSP is 35 mmHg. There is no evidence of stenosis.   Pulmonic Valve Pulmonic valve structure is normal. There is no evidence of regurgitation. There is no evidence of stenosis.   Ascending Aorta Aortic root is normal in size.  Ascending " aorta is mildly enlarged.   IVC/SVC The inferior vena cava is normal in size. Respirophasic changes were normal.   Pericardium Pericardium is structurally normal.  No pericardial pathology. There is no pericardial effusion. The pericardium is normal in appearance.       Results Review Statement: I personally reviewed the following image studies in PACS and associated radiology reports: CT chest and Echocardiogram. My interpretation of the radiology images/reports is: as above.    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  Radiographic findings of ascending aorta aneurysm without significant change (46 mm at it's greatest diameter), were confirmed and shared with the patient today.    cts surveillance plan: The aneurysm size remains unchanged, and does not meet surgical indications for intervention. Therefore follow-up monitoring will be the treatment plan.  Arrangements have been made for future surveillance to be completed with CT chest, without contrast in 1 year.    Axel Kaminski was comfortable with our recommendations, and their  questions were answered to their satisfaction.  Thank you for allowing us to participate in the care of this patient.     Aortic Aneurysm Instructions were provided to the patient as follows:    1. No heavy sustained lifting which would require excessive straining  2. Maintain a controlled blood pressure with a goal of 120/80.  3. Follow up in Aortic Clinic as recommended with radiology follow up as instructed  4. Report to the ER or call 911 immediately with the following signs / symptoms: sudden onset of back pain, chest pain or shortness of breath.    The patient recently had a screening colonoscopy in October 2022.  Therefore GI referral is not indicated at this time.     SIGNATURE: Dora Bridges PA-C  DATE: 12/02/24  TIME: 2:41 PM

## 2025-01-30 ENCOUNTER — TELEPHONE (OUTPATIENT)
Dept: FAMILY MEDICINE CLINIC | Facility: CLINIC | Age: 55
End: 2025-01-30

## 2025-03-18 DIAGNOSIS — F33.41 RECURRENT MAJOR DEPRESSIVE DISORDER, IN PARTIAL REMISSION (HCC): ICD-10-CM

## 2025-03-18 DIAGNOSIS — E03.9 ACQUIRED HYPOTHYROIDISM: ICD-10-CM

## 2025-03-20 RX ORDER — LEVOTHYROXINE SODIUM 125 UG/1
125 TABLET ORAL
Qty: 30 TABLET | Refills: 6 | Status: SHIPPED | OUTPATIENT
Start: 2025-03-20

## 2025-03-20 RX ORDER — FLUOXETINE HYDROCHLORIDE 40 MG/1
40 CAPSULE ORAL DAILY
Qty: 30 CAPSULE | Refills: 5 | Status: SHIPPED | OUTPATIENT
Start: 2025-03-20

## 2025-06-10 ENCOUNTER — OFFICE VISIT (OUTPATIENT)
Dept: FAMILY MEDICINE CLINIC | Facility: CLINIC | Age: 55
End: 2025-06-10
Payer: COMMERCIAL

## 2025-06-10 ENCOUNTER — APPOINTMENT (OUTPATIENT)
Dept: LAB | Facility: CLINIC | Age: 55
End: 2025-06-10
Payer: COMMERCIAL

## 2025-06-10 VITALS
OXYGEN SATURATION: 98 % | SYSTOLIC BLOOD PRESSURE: 125 MMHG | BODY MASS INDEX: 29.81 KG/M2 | DIASTOLIC BLOOD PRESSURE: 65 MMHG | WEIGHT: 201.28 LBS | HEIGHT: 69 IN | HEART RATE: 67 BPM

## 2025-06-10 DIAGNOSIS — J31.0 CHRONIC RHINITIS: ICD-10-CM

## 2025-06-10 DIAGNOSIS — F32.5 DEPRESSION, MAJOR, IN REMISSION (HCC): ICD-10-CM

## 2025-06-10 DIAGNOSIS — Z00.00 ANNUAL PHYSICAL EXAM: Primary | ICD-10-CM

## 2025-06-10 DIAGNOSIS — F19.11 HISTORY OF SUBSTANCE ABUSE (HCC): ICD-10-CM

## 2025-06-10 DIAGNOSIS — I71.21 ANEURYSM OF ASCENDING AORTA WITHOUT RUPTURE (HCC): ICD-10-CM

## 2025-06-10 DIAGNOSIS — Q23.81 BICUSPID AORTIC VALVE: ICD-10-CM

## 2025-06-10 DIAGNOSIS — L91.8 SKIN TAG: ICD-10-CM

## 2025-06-10 DIAGNOSIS — Z11.59 NEED FOR HEPATITIS C SCREENING TEST: ICD-10-CM

## 2025-06-10 DIAGNOSIS — E03.9 ACQUIRED HYPOTHYROIDISM: ICD-10-CM

## 2025-06-10 DIAGNOSIS — Z76.89 ENCOUNTER TO ESTABLISH CARE: ICD-10-CM

## 2025-06-10 DIAGNOSIS — Z87.891 FORMER SMOKER: ICD-10-CM

## 2025-06-10 DIAGNOSIS — G43.909 MIGRAINE WITHOUT STATUS MIGRAINOSUS, NOT INTRACTABLE, UNSPECIFIED MIGRAINE TYPE: ICD-10-CM

## 2025-06-10 DIAGNOSIS — E78.2 MIXED HYPERLIPIDEMIA: ICD-10-CM

## 2025-06-10 DIAGNOSIS — F51.01 PRIMARY INSOMNIA: ICD-10-CM

## 2025-06-10 DIAGNOSIS — F10.21 HISTORY OF ALCOHOLISM (HCC): ICD-10-CM

## 2025-06-10 DIAGNOSIS — I10 ESSENTIAL HYPERTENSION: ICD-10-CM

## 2025-06-10 PROCEDURE — 99396 PREV VISIT EST AGE 40-64: CPT | Performed by: PHYSICIAN ASSISTANT

## 2025-06-10 PROCEDURE — 36415 COLL VENOUS BLD VENIPUNCTURE: CPT

## 2025-06-10 PROCEDURE — 86803 HEPATITIS C AB TEST: CPT

## 2025-06-10 PROCEDURE — 99214 OFFICE O/P EST MOD 30 MIN: CPT | Performed by: PHYSICIAN ASSISTANT

## 2025-06-10 RX ORDER — QUETIAPINE FUMARATE 25 MG/1
25 TABLET, FILM COATED ORAL
Qty: 30 TABLET | Refills: 5 | Status: SHIPPED | OUTPATIENT
Start: 2025-06-10

## 2025-06-10 NOTE — ASSESSMENT & PLAN NOTE
Follows with cardiology for known bicuspid aortic valve.  We talked about the significance of this and how two cusps can wear out sooner than the 3 cusp valve.

## 2025-06-10 NOTE — ASSESSMENT & PLAN NOTE
History of hyperlipidemia.  Diet controlled.  Has lost 13 pounds through intermittent fasting.  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

## 2025-06-10 NOTE — PROGRESS NOTES
Name: Axel Kaminski      : 1970      MRN: [id number]  Encounter Provider: Elizabeth Lanza PA-C  Encounter Date: 6/10/2025   Encounter department: [facility]  :  Assessment & Plan  Annual physical exam  Formerly saw Dr. Rivera.  Physical exam due and completed today.       Acquired hypothyroidism  Probably has a history of Hashimoto's thyroiditis.  On long-term thyroid replacement.  Patient's mother also had thyroid replacement.  Will check TSH to ensure the dosing is correct.  Orders:    TSH, 3rd generation with Free T4 reflex; Future    Chronic rhinitis  Patient has had allergy symptoms since age 18.  Previously saw Dr. Gentile and received allergy shots for immunotherapy for 10 years.  He also had septoplasty and removal of polyps.  Recently saw Dr. FREEDOM Strong and mold allergy was identified.  He is going to continue taking Flonase and oral antihistamine.  Went over the proper method for the liver and Flonase.       Mixed hyperlipidemia  History of hyperlipidemia.  Diet controlled.  Has lost 13 pounds through intermittent fasting.  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       Migraine without status migrainosus, not intractable, unspecified migraine type  Has been given sumatriptan but did not need to take this since headaches have dissipated.         Essential hypertension  Prescribed losartan for hypertension and blood pressure well-controlled with visit today.       Bicuspid aortic valve  Follows with cardiology for known bicuspid aortic valve.  We talked about the significance of this and how two cusps can wear out sooner than the 3 cusp valve.       Depression, major, in remission (HCC)  Patient's PHQ-9 score was 0.  Has been taking Prozac for years.  Interested in stopping the medication.  Provided him with titration down regimen every other night for 2 weeks and every third night for 2 weeks then stop we can monitor for  return of depression symptoms to see if Lexapro needs to restart once totally stopped.         Primary insomnia  Patient taking Seroquel for both sleep enhancement and to help with his thought processes.  Orders:    QUEtiapine (SEROquel) 25 mg tablet; Take 1 tablet (25 mg total) by mouth daily at bedtime    Former smoker  Non-smoker for 12 years.  Continues to use nicotine gum.  Goes to a dentist on a regular basis.       History of substance abuse (HCC)  Nonuser for 4 years.  Had been using pain medications for 10 years.  Went on Suboxone for 10 years.  Stopped the Suboxone and went on injectable naltrexone.  Continues to go to AA and NA meetings 2-3 meetings per week.  Has not had any recurrence of substance use or alcohol in 4 years.  Very successful outcome.       History of alcoholism (HCC)  No alcohol intake for 4 years.  Had gotten naltrexone injectable which has helped with the cravings.  No longer needing the injectable.  Continues to go to AA and NA meetings.       Skin tag  Patient has a large skin tag left medial thigh that he would like removed.  He is going to have a preauthorization done by his insurance and I will see him in 2 weeks for this removal.  He will need lidocaine injection and double ligation suture tie.       Need for hepatitis C screening test  Patient has not been tested for hep C in the past.  No history of IV drug use.  Willing to get tested.  Orders:    Hepatitis C Antibody; Future    Aneurysm of ascending aorta without rupture (HCC)  4.6 cm aneurysm noted of ascending thoracic aorta.  Continue to monitor.  Follows with cardiology.       Encounter to establish care  Patient wishes to establish care at this facility.  Annual exam done.  TSH ordered.  I will be removing his skin tag in 2 weeks.              History of Present Illness   HPI: 54-year-old male  and with a 25-year-old son is establishing care.  History is as above linked with his various diagnoses.  He works in the  "[employer] from 4 PM to midnight about 30 hours/week.  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    Has successfully overcome addiction to pain meds and alcohol.  Continues to attend AA and NA meetings and this has kept him clean and sober for the last 4 years.  He is eliminating or at least trying to eliminate the stresses in his life and he has been successful in doing so.    He has a large skin tag on the left anterior groin that he wants to have removed.  He keeps rubbing against his close when he is walking and he is walking at least 5 miles per night as part of his job in the [employer].    He is not having pain in his chest heart palpitations dizziness or lightheadedness.  No syncope or presyncope.  No changes in his bowel or bladder habits.  Review of Systems: No recent URI or viral syndrome.    Objective   /65 (BP Location: Right arm, Patient Position: Sitting, Cuff Size: Standard)   Pulse 67   Ht 5' 9\" (1.753 m)   Wt 91.3 kg (201 lb 4.5 oz)   SpO2 98%   BMI 29.72 kg/m²      Physical Exam: Reviewed vital signs.  He is normotensive and afebrile.    Well-developed well-nourished 54 y.o. year old male who is cooperative with the exam.  Patient is alert and oriented x3.  Patient is appropriate in answering all questions.    HEENT:  Normocephalic.  PERRLA.  EOMs intact.  TMs are clear with identification of bony landmarks.  No tragus or pinnae tenderness.  No pre or posterior auricular adenopathy.  Sinuses without tenderness.  Throat without hyperemia.  Neck:  Supple without adenopathy.  Thyroid midline without thyromegaly .  Bobby test did not lateralize.  Rinne test showed AC greater than BC bilaterally.  Chest symmetric and nontender.  Heart regular rate and rhythm.  No murmur rubs or gallops.  Point of maximum impulse not displaced.  Lungs are clear to auscultation.  Breathing is nonlabored.  Aerating bases well.  Abdomen round and soft positive " bowel sounds without masses tenderness or organomegaly.  Extremities reveal adequate peripheral pulses without peripheral edema.    Integument: Patient has a large 3 cm skin tag left anterior thigh with a very fleshy appearance.  Administrative Statements   Encounter provider Elizabeth Lanza PA-C    I have spent a total time of 45 minutes in caring for this patient on the day of the visit/encounter including Impressions, Counseling / Coordination of care, Documenting in the medical record, Reviewing/placing orders in the medical record (including tests, medications, and/or procedures), and Obtaining or reviewing history      I will see him in 2 weeks to perform his skin tag once his insurance authorizes this.

## 2025-06-10 NOTE — ASSESSMENT & PLAN NOTE
Patient's PHQ-9 score was 0.  Has been taking Prozac for years.  Interested in stopping the medication.  Provided him with titration down regimen every other night for 2 weeks and every third night for 2 weeks then stop we can monitor for return of depression symptoms to see if Lexapro needs to restart once totally stopped.

## 2025-06-10 NOTE — ASSESSMENT & PLAN NOTE
Probably has a history of Hashimoto's thyroiditis.  On long-term thyroid replacement.  Patient's mother also had thyroid replacement.  Will check TSH to ensure the dosing is correct.  Orders:    TSH, 3rd generation with Free T4 reflex; Future

## 2025-06-10 NOTE — ASSESSMENT & PLAN NOTE
4.6 cm aneurysm noted of ascending thoracic aorta.  Continue to monitor.  Follows with cardiology.

## 2025-06-10 NOTE — ASSESSMENT & PLAN NOTE
Non-smoker for 12 years.  Continues to use nicotine gum.  Goes to a dentist on a regular basis.

## 2025-06-11 ENCOUNTER — RESULTS FOLLOW-UP (OUTPATIENT)
Dept: FAMILY MEDICINE CLINIC | Facility: CLINIC | Age: 55
End: 2025-06-11

## 2025-06-11 LAB — HCV AB SER QL: NORMAL

## 2025-06-11 NOTE — TELEPHONE ENCOUNTER
----- Message from Elizabeth Lanza PA-C sent at 6/11/2025  3:04 PM EDT -----  Please let the patient know that the hepatitis C test is normal and nonreactive.    [place]  ----- Message -----  From: Lab, Background User  Sent: 6/11/2025  12:31 PM EDT  To: Elizabeth Lanza PA-C

## 2025-06-23 ENCOUNTER — OFFICE VISIT (OUTPATIENT)
Dept: FAMILY MEDICINE CLINIC | Facility: CLINIC | Age: 55
End: 2025-06-23
Payer: COMMERCIAL

## 2025-06-23 VITALS
WEIGHT: 198.85 LBS | SYSTOLIC BLOOD PRESSURE: 114 MMHG | OXYGEN SATURATION: 98 % | BODY MASS INDEX: 29.37 KG/M2 | DIASTOLIC BLOOD PRESSURE: 78 MMHG | HEART RATE: 54 BPM

## 2025-06-23 DIAGNOSIS — L91.8 SKIN TAG: Primary | ICD-10-CM

## 2025-06-23 DIAGNOSIS — E78.2 MIXED HYPERLIPIDEMIA: ICD-10-CM

## 2025-06-23 PROBLEM — F51.01 PRIMARY INSOMNIA: Status: ACTIVE | Noted: 2025-06-23

## 2025-06-23 PROCEDURE — 11200 RMVL SKIN TAGS UP TO&INC 15: CPT | Performed by: PHYSICIAN ASSISTANT

## 2025-06-23 NOTE — PROGRESS NOTES
"Skin tag removal    Date/Time: 6/23/2025 9:40 AM    Performed by: Elizabeth Lanza PA-C  Authorized by: Elizabeth Lanza PA-C    Universal Protocol:  procedure performed by consultantConsent: Verbal consent obtained  Risks and benefits: risks, benefits and alternatives were discussed  Consent given by: patient  Time out: Immediately prior to procedure a \"time out\" was called to verify the correct patient, procedure, equipment, support staff and site/side marked as required.  Patient understanding: patient states understanding of the procedure being performed  Patient consent: the patient's understanding of the procedure matches consent given  Procedure consent: procedure consent matches procedure scheduled  Relevant documents: relevant documents present and verified  Test results: test results available and properly labeled  Site marked: the operative site was not marked  Radiology Images displayed and confirmed. If images not available, report reviewed: imaging studies available  Required items: required blood products, implants, devices, and special equipment available  Patient identity confirmed: verbally with patient      Procedure Details - Skin Tag Destruction:     Up to 15      Body area:  Lower extremity    Lower extremity location:  L upper leg    Initial size (mm):  3    Final defect size (mm):  0    Malignancy: benign lesion      Destruction method: scissors used for extraction    Lesion 6:      Patient requested skin tag removal left anteromedial upper thigh/groin area.  When he is walking, the area would be rubbing it is caused irritation.  No other skin tags noted.  1% lidocaine used as anesthetic since it was a larger skin tag that might contain a blood vessel.  4-0 nylon used as double ligation prior to cutting the skin tag.  He will allow the sutures to follow-up on their own.  Band-Aid applied which can be removed tomorrow with just soap and water to the affected brain.      "

## 2025-07-08 ENCOUNTER — APPOINTMENT (OUTPATIENT)
Dept: LAB | Facility: CLINIC | Age: 55
End: 2025-07-08
Payer: COMMERCIAL

## 2025-07-08 DIAGNOSIS — E03.9 ACQUIRED HYPOTHYROIDISM: ICD-10-CM

## 2025-07-08 DIAGNOSIS — E78.2 MIXED HYPERLIPIDEMIA: ICD-10-CM

## 2025-07-08 LAB
CHOLEST SERPL-MCNC: 213 MG/DL (ref ?–200)
HDLC SERPL-MCNC: 68 MG/DL
LDLC SERPL CALC-MCNC: 130 MG/DL (ref 0–100)
NONHDLC SERPL-MCNC: 145 MG/DL
TRIGL SERPL-MCNC: 77 MG/DL (ref ?–150)
TSH SERPL DL<=0.05 MIU/L-ACNC: 3.12 UIU/ML (ref 0.45–4.5)

## 2025-07-08 PROCEDURE — 36415 COLL VENOUS BLD VENIPUNCTURE: CPT

## 2025-07-08 PROCEDURE — 80061 LIPID PANEL: CPT

## 2025-07-08 PROCEDURE — 84443 ASSAY THYROID STIM HORMONE: CPT
